# Patient Record
Sex: FEMALE | Race: WHITE | NOT HISPANIC OR LATINO | Employment: UNEMPLOYED | URBAN - NONMETROPOLITAN AREA
[De-identification: names, ages, dates, MRNs, and addresses within clinical notes are randomized per-mention and may not be internally consistent; named-entity substitution may affect disease eponyms.]

---

## 2020-08-18 ENCOUNTER — HOSPITAL ENCOUNTER (EMERGENCY)
Facility: HOSPITAL | Age: 25
Discharge: HOME | End: 2020-08-18
Attending: EMERGENCY MEDICINE
Payer: MEDICAID

## 2020-08-18 VITALS
SYSTOLIC BLOOD PRESSURE: 115 MMHG | OXYGEN SATURATION: 100 % | HEIGHT: 60 IN | DIASTOLIC BLOOD PRESSURE: 77 MMHG | RESPIRATION RATE: 12 BRPM | HEART RATE: 85 BPM | BODY MASS INDEX: 26.26 KG/M2 | TEMPERATURE: 98 F

## 2020-08-18 DIAGNOSIS — H92.03 OTALGIA OF BOTH EARS: Primary | ICD-10-CM

## 2020-08-18 PROCEDURE — 99284 EMERGENCY DEPT VISIT MOD MDM: CPT | Mod: 25

## 2020-08-18 PROCEDURE — 63600175 PHARM REV CODE 636 W HCPCS: Performed by: NURSE PRACTITIONER

## 2020-08-18 PROCEDURE — 96372 THER/PROPH/DIAG INJ SC/IM: CPT | Mod: 59

## 2020-08-18 PROCEDURE — 25000003 PHARM REV CODE 250: Performed by: NURSE PRACTITIONER

## 2020-08-18 RX ORDER — ACETAMINOPHEN 325 MG/1
650 TABLET ORAL
Status: COMPLETED | OUTPATIENT
Start: 2020-08-18 | End: 2020-08-18

## 2020-08-18 RX ORDER — LORATADINE 10 MG/1
10 TABLET ORAL DAILY
Qty: 30 TABLET | Refills: 0 | Status: SHIPPED | OUTPATIENT
Start: 2020-08-18 | End: 2020-08-18 | Stop reason: SDUPTHER

## 2020-08-18 RX ORDER — LORATADINE 10 MG/1
10 TABLET ORAL DAILY
Qty: 30 TABLET | Refills: 0 | Status: ON HOLD | OUTPATIENT
Start: 2020-08-18 | End: 2023-08-16 | Stop reason: HOSPADM

## 2020-08-18 RX ORDER — BETAMETHASONE SODIUM PHOSPHATE AND BETAMETHASONE ACETATE 3; 3 MG/ML; MG/ML
6 INJECTION, SUSPENSION INTRA-ARTICULAR; INTRALESIONAL; INTRAMUSCULAR; SOFT TISSUE
Status: COMPLETED | OUTPATIENT
Start: 2020-08-18 | End: 2020-08-18

## 2020-08-18 RX ADMIN — ACETAMINOPHEN 650 MG: 325 TABLET ORAL at 02:08

## 2020-08-18 RX ADMIN — BETAMETHASONE SODIUM PHOSPHATE AND BETAMETHASONE ACETATE 6 MG: 3; 3 INJECTION, SUSPENSION INTRA-ARTICULAR; INTRALESIONAL; INTRAMUSCULAR at 02:08

## 2020-08-18 NOTE — ED PROVIDER NOTES
Encounter Date: 8/18/2020       History     Chief Complaint   Patient presents with    Ear Drainage     alhaji ear pain beginning one day ago.  c/o dizziness due to ear fullness and swelling to inside of ears     Pt c/o bilat ear pain starting yesterday.Pt denies injury to her ears.  Pt denies drainage from ears. Pt denies decreased hearing.  Pt denies dental pain.  Pt denies cough or congestion.  She took Tylenol yesterday with minimal relief.          Review of patient's allergies indicates:   Allergen Reactions    Amoxicillin Swelling    Naproxen Nausea And Vomiting    Neurontin [gabapentin] Nausea And Vomiting    Toradol [ketorolac] Nausea And Vomiting    Tramadol Nausea And Vomiting     Past Medical History:   Diagnosis Date    Depression      Past Surgical History:   Procedure Laterality Date    CHOLECYSTECTOMY       Family History   Problem Relation Age of Onset    Alcohol abuse Mother     COPD Mother     Arthritis Father      Social History     Tobacco Use    Smoking status: Never Smoker    Smokeless tobacco: Never Used   Substance Use Topics    Alcohol use: No     Alcohol/week: 0.0 standard drinks    Drug use: No     Review of Systems   Constitutional: Negative.    HENT: Positive for ear pain. Negative for congestion, dental problem, ear discharge, facial swelling, hearing loss, postnasal drip, rhinorrhea, sinus pressure, sinus pain, sneezing, sore throat, tinnitus, trouble swallowing and voice change.    Eyes: Negative for pain and discharge.   Respiratory: Negative.    Cardiovascular: Negative.    Gastrointestinal: Negative for abdominal distention, abdominal pain, diarrhea, nausea and vomiting.   Genitourinary: Negative for difficulty urinating and dysuria.   Musculoskeletal: Negative.    Skin: Negative.    Neurological: Negative.        Physical Exam     Initial Vitals [08/18/20 1429]   BP Pulse Resp Temp SpO2   115/77 85 12 98.4 °F (36.9 °C) 100 %      MAP       --         Physical  Exam    Nursing note and vitals reviewed.  Constitutional: She appears well-developed and well-nourished. She is not diaphoretic. No distress.   HENT:   Head: Normocephalic.   Right Ear: Hearing, tympanic membrane, external ear and ear canal normal. There is tenderness. No drainage or swelling. No foreign bodies. Tympanic membrane is not bulging. No middle ear effusion. No decreased hearing is noted.   Left Ear: Hearing, tympanic membrane, external ear and ear canal normal. There is tenderness. No drainage or swelling. No foreign bodies. Tympanic membrane is not bulging.  No middle ear effusion. No decreased hearing is noted.   Mouth/Throat: Uvula is midline, oropharynx is clear and moist and mucous membranes are normal. No uvula swelling or dental caries. No oropharyngeal exudate, posterior oropharyngeal edema or posterior oropharyngeal erythema.   Eyes: Pupils are equal, round, and reactive to light.   Neck: Neck supple.   Cardiovascular: Normal rate.   Pulmonary/Chest: Breath sounds normal. No respiratory distress. She has no wheezes.   Abdominal: Soft. Bowel sounds are normal. She exhibits no distension. There is no abdominal tenderness.   Musculoskeletal: Normal range of motion. No tenderness or edema.   Neurological: She is alert and oriented to person, place, and time. She has normal strength. GCS score is 15. GCS eye subscore is 4. GCS verbal subscore is 5. GCS motor subscore is 6.   Skin: Skin is warm and dry. No rash noted. No erythema. No pallor.         ED Course   Procedures  Labs Reviewed - No data to display       Imaging Results    None          Medical Decision Making:   Differential Diagnosis:   Otitis media   Otitis externa  Viral illness  Otalgia                                   Clinical Impression:       ICD-10-CM ICD-9-CM   1. Otalgia of both ears  H92.03 388.70         Disposition:   Disposition: Discharged  Condition: Stable                        Aurelia Osman NP  08/18/20 [id number]

## 2020-10-31 ENCOUNTER — HOSPITAL ENCOUNTER (EMERGENCY)
Facility: HOSPITAL | Age: 25
Discharge: HOME | End: 2020-11-01
Attending: EMERGENCY MEDICINE
Payer: MEDICAID

## 2020-10-31 DIAGNOSIS — R10.9 FLANK PAIN: Primary | ICD-10-CM

## 2020-10-31 LAB
ALBUMIN SERPL BCP-MCNC: 3.9 G/DL (ref 3.5–5.2)
ALP SERPL-CCNC: 72 U/L (ref 55–135)
ALT SERPL W/O P-5'-P-CCNC: 19 U/L (ref 10–44)
ANION GAP SERPL CALC-SCNC: 6 MMOL/L (ref 8–16)
AST SERPL-CCNC: 11 U/L (ref 10–40)
BACTERIA #/AREA URNS HPF: NEGATIVE /HPF
BASOPHILS # BLD AUTO: 0.1 K/UL (ref 0–0.2)
BASOPHILS NFR BLD: 0.9 % (ref 0–1.9)
BILIRUB SERPL-MCNC: 0.4 MG/DL (ref 0.1–1)
BILIRUB UR QL STRIP: NEGATIVE
BUN SERPL-MCNC: 11 MG/DL (ref 6–20)
CALCIUM SERPL-MCNC: 9.1 MG/DL (ref 8.7–10.5)
CHLORIDE SERPL-SCNC: 107 MMOL/L (ref 95–110)
CLARITY UR: CLEAR
CO2 SERPL-SCNC: 26 MMOL/L (ref 23–29)
COLOR UR: YELLOW
CREAT SERPL-MCNC: 0.8 MG/DL (ref 0.5–1.4)
DIFFERENTIAL METHOD: ABNORMAL
EOSINOPHIL # BLD AUTO: 0.4 K/UL (ref 0–0.5)
EOSINOPHIL NFR BLD: 3.7 % (ref 0–8)
ERYTHROCYTE [DISTWIDTH] IN BLOOD BY AUTOMATED COUNT: 13.2 % (ref 11.5–14.5)
EST. GFR  (AFRICAN AMERICAN): >60 ML/MIN/1.73 M^2
EST. GFR  (NON AFRICAN AMERICAN): >60 ML/MIN/1.73 M^2
GLUCOSE SERPL-MCNC: 80 MG/DL (ref 70–110)
GLUCOSE UR QL STRIP: NEGATIVE
HCG INTACT+B SERPL-ACNC: <1 MIU/ML
HCT VFR BLD AUTO: 39.3 % (ref 37–48.5)
HGB BLD-MCNC: 12.5 G/DL (ref 12–16)
HGB UR QL STRIP: NEGATIVE
HYALINE CASTS #/AREA URNS LPF: 0 /LPF
IMM GRANULOCYTES # BLD AUTO: 0.03 K/UL (ref 0–0.04)
IMM GRANULOCYTES NFR BLD AUTO: 0.3 % (ref 0–0.5)
KETONES UR QL STRIP: NEGATIVE
LEUKOCYTE ESTERASE UR QL STRIP: ABNORMAL
LIPASE SERPL-CCNC: 57 U/L (ref 23–300)
LYMPHOCYTES # BLD AUTO: 1.9 K/UL (ref 1–4.8)
LYMPHOCYTES NFR BLD: 16.2 % (ref 18–48)
MCH RBC QN AUTO: 27.7 PG (ref 27–31)
MCHC RBC AUTO-ENTMCNC: 31.8 G/DL (ref 32–36)
MCV RBC AUTO: 87 FL (ref 82–98)
MICROSCOPIC COMMENT: NORMAL
MONOCYTES # BLD AUTO: 1.3 K/UL (ref 0.3–1)
MONOCYTES NFR BLD: 11.4 % (ref 4–15)
NEUTROPHILS # BLD AUTO: 7.9 K/UL (ref 1.8–7.7)
NEUTROPHILS NFR BLD: 67.5 % (ref 38–73)
NITRITE UR QL STRIP: NEGATIVE
NRBC BLD-RTO: 0 /100 WBC
PH UR STRIP: 6 [PH] (ref 5–8)
PLATELET # BLD AUTO: 347 K/UL (ref 150–350)
PMV BLD AUTO: 10 FL (ref 9.2–12.9)
POTASSIUM SERPL-SCNC: 4 MMOL/L (ref 3.5–5.1)
PROT SERPL-MCNC: 7.6 G/DL (ref 6–8.4)
PROT UR QL STRIP: NEGATIVE
RBC # BLD AUTO: 4.51 M/UL (ref 4–5.4)
RBC #/AREA URNS HPF: 1 /HPF (ref 0–4)
SODIUM SERPL-SCNC: 139 MMOL/L (ref 136–145)
SP GR UR STRIP: 1.02 (ref 1–1.03)
SQUAMOUS #/AREA URNS HPF: 1 /HPF
URN SPEC COLLECT METH UR: ABNORMAL
UROBILINOGEN UR STRIP-ACNC: 1 EU/DL
WBC # BLD AUTO: 11.7 K/UL (ref 3.9–12.7)
WBC #/AREA URNS HPF: 1 /HPF (ref 0–5)

## 2020-10-31 PROCEDURE — 96361 HYDRATE IV INFUSION ADD-ON: CPT

## 2020-10-31 PROCEDURE — 96375 TX/PRO/DX INJ NEW DRUG ADDON: CPT

## 2020-10-31 PROCEDURE — 25000003 PHARM REV CODE 250: Performed by: EMERGENCY MEDICINE

## 2020-10-31 PROCEDURE — 63600175 PHARM REV CODE 636 W HCPCS: Performed by: EMERGENCY MEDICINE

## 2020-10-31 PROCEDURE — 81000 URINALYSIS NONAUTO W/SCOPE: CPT

## 2020-10-31 PROCEDURE — 85025 COMPLETE CBC W/AUTO DIFF WBC: CPT

## 2020-10-31 PROCEDURE — 99284 EMERGENCY DEPT VISIT MOD MDM: CPT | Mod: 25

## 2020-10-31 PROCEDURE — 80053 COMPREHEN METABOLIC PANEL: CPT

## 2020-10-31 PROCEDURE — 36415 COLL VENOUS BLD VENIPUNCTURE: CPT

## 2020-10-31 PROCEDURE — 96374 THER/PROPH/DIAG INJ IV PUSH: CPT

## 2020-10-31 PROCEDURE — 83690 ASSAY OF LIPASE: CPT

## 2020-10-31 PROCEDURE — 84702 CHORIONIC GONADOTROPIN TEST: CPT

## 2020-10-31 RX ORDER — MORPHINE SULFATE 4 MG/ML
4 INJECTION, SOLUTION INTRAMUSCULAR; INTRAVENOUS
Status: COMPLETED | OUTPATIENT
Start: 2020-10-31 | End: 2020-10-31

## 2020-10-31 RX ORDER — ONDANSETRON 2 MG/ML
4 INJECTION INTRAMUSCULAR; INTRAVENOUS
Status: COMPLETED | OUTPATIENT
Start: 2020-10-31 | End: 2020-10-31

## 2020-10-31 RX ADMIN — MORPHINE SULFATE 4 MG: 4 INJECTION, SOLUTION INTRAMUSCULAR; INTRAVENOUS at 10:10

## 2020-10-31 RX ADMIN — SODIUM CHLORIDE 1000 ML: 0.9 INJECTION, SOLUTION INTRAVENOUS at 09:10

## 2020-10-31 RX ADMIN — ONDANSETRON 4 MG: 2 INJECTION INTRAMUSCULAR; INTRAVENOUS at 10:10

## 2020-11-01 VITALS
RESPIRATION RATE: 16 BRPM | HEIGHT: 60 IN | BODY MASS INDEX: 32.04 KG/M2 | OXYGEN SATURATION: 100 % | TEMPERATURE: 98 F | DIASTOLIC BLOOD PRESSURE: 75 MMHG | SYSTOLIC BLOOD PRESSURE: 114 MMHG | WEIGHT: 163.19 LBS | HEART RATE: 85 BPM

## 2020-11-01 RX ORDER — ONDANSETRON 4 MG/1
4 TABLET, ORALLY DISINTEGRATING ORAL EVERY 6 HOURS PRN
Qty: 12 TABLET | Refills: 0 | Status: SHIPPED | OUTPATIENT
Start: 2020-11-01 | End: 2022-11-15 | Stop reason: SDUPTHER

## 2020-11-01 RX ORDER — DICYCLOMINE HYDROCHLORIDE 20 MG/1
20 TABLET ORAL 2 TIMES DAILY
Qty: 20 TABLET | Refills: 0 | Status: SHIPPED | OUTPATIENT
Start: 2020-11-01 | End: 2020-12-01

## 2020-11-01 NOTE — ED PROVIDER NOTES
Encounter Date: 10/31/2020       History     Chief Complaint   Patient presents with    Flank Pain     RIGHT FLANK PAIN RADIATING TO RIGHT LOW ABDOMEN FOR 2 DAYS WITH NAUSEA AND VOMTING     The patient is a 24-year-old female, that presents to the ER complaining of right-sided flank pain.  The patient states that for the last 2 days she has been experiencing right flank pressure, with sharp pain radiating around to her right lower abdominal area.  The patient does report associated nausea and has experienced several episodes of nonbilious nonbloody emesis.  Patient denies any cough or congestion, focal anterior abdominal pain, fevers, chills or any other acute negative symptoms.        Review of patient's allergies indicates:   Allergen Reactions    Amoxicillin Swelling    Naproxen Nausea And Vomiting    Neurontin [gabapentin] Nausea And Vomiting    Toradol [ketorolac] Nausea And Vomiting    Tramadol Nausea And Vomiting     Past Medical History:   Diagnosis Date    Anxiety     Depression      Past Surgical History:   Procedure Laterality Date    CHOLECYSTECTOMY       Family History   Problem Relation Age of Onset    Alcohol abuse Mother     COPD Mother     Arthritis Father      Social History     Tobacco Use    Smoking status: Former Smoker    Smokeless tobacco: Never Used   Substance Use Topics    Alcohol use: Not Currently     Alcohol/week: 0.0 standard drinks    Drug use: No     Review of Systems   All other systems reviewed and are negative.      Physical Exam     Initial Vitals [10/31/20 2046]   BP Pulse Resp Temp SpO2   (!) 116/94 83 18 98.1 °F (36.7 °C) 100 %      MAP       --         Physical Exam    Nursing note and vitals reviewed.  Constitutional: She appears well-developed and well-nourished.   HENT:   Head: Normocephalic and atraumatic.   Eyes: Conjunctivae and EOM are normal. Pupils are equal, round, and reactive to light.   Neck: Normal range of motion. Neck supple. No tracheal  deviation present.   Cardiovascular: Normal rate, regular rhythm, normal heart sounds and intact distal pulses.   Pulmonary/Chest: Breath sounds normal. No respiratory distress. She has no wheezes. She has no rhonchi. She has no rales. She exhibits no tenderness.   Abdominal: Soft. Bowel sounds are normal. She exhibits no distension and no mass. There is no abdominal tenderness. There is no rebound and no guarding.   Genitourinary:    Genitourinary Comments: Mild right-sided percussive CVA tenderness     Musculoskeletal: Normal range of motion. No tenderness or edema.   Neurological: She is alert and oriented to person, place, and time. She has normal strength and normal reflexes. She displays normal reflexes. No cranial nerve deficit or sensory deficit.   Skin: Skin is warm and dry. Capillary refill takes less than 2 seconds.   Psychiatric: She has a normal mood and affect. Her behavior is normal. Judgment and thought content normal.         ED Course   Procedures  Labs Reviewed   CBC W/ AUTO DIFFERENTIAL - Abnormal; Notable for the following components:       Result Value    MCHC 31.8 (*)     Gran # (ANC) 7.9 (*)     Mono # 1.3 (*)     Lymph % 16.2 (*)     All other components within normal limits   COMPREHENSIVE METABOLIC PANEL - Abnormal; Notable for the following components:    Anion Gap 6 (*)     All other components within normal limits   URINALYSIS, REFLEX TO URINE CULTURE - Abnormal; Notable for the following components:    Leukocytes, UA Trace (*)     All other components within normal limits    Narrative:     Specimen Source->Urine   LIPASE   HCG, QUANTITATIVE, PREGNANCY   URINALYSIS MICROSCOPIC    Narrative:     Specimen Source->Urine          Imaging Results          CT Abdomen Pelvis  Without Contrast (Final result)  Result time 11/01/20 00:02:47    Final result by Jerel Duong MD (11/01/20 00:02:47)                 Impression:      1. No acute inflammatory process detected within the abdomen or  pelvis on this noncontrast study.  2. Mild dependent pelvic free fluid which is nonspecific, but in the range of physiologic.      Electronically signed by: Jerel Duong MD  Date:    11/01/2020  Time:    00:02             Narrative:    EXAMINATION:  CT ABDOMEN PELVIS WITHOUT CONTRAST    CLINICAL HISTORY:  Right flank pain flank pain;    TECHNIQUE:  Axial CT images were obtained from the lung bases through the pelvis without oral or intravenous contrast.  Multiplanar reconstructions evaluated.  Iterative reconstruction technique was used.  CT/Cardiac nuclear examinations in the past 12 months: 0    COMPARISON:  06/24/2019    FINDINGS:  Imaged lung bases are clear.  Lack of intravenous and oral contrast limits evaluation of the abdominal and pelvic structures.  Unenhanced liver, spleen, adrenals, pancreas, gallbladder, and kidneys are unremarkable.  No evidence of nephroureterolithiasis or hydroureteronephrosis.  No bowel wall thickening or pattern of obstruction.  Normal appendix.  No adenopathy or free air.  Mild dependent pelvic free fluid.  No urinary bladder wall thickening or radiodense calculi.  No detected uterine or adnexal abnormality within the confines of this modality.  No acute osseous change.                                 Medical Decision Making:   ED Management:  The patient/grossly within normal limits, but because the patient did have reproducible percussive CVA tenderness a CT scan was performed.  The CT was ultimately negative for any acute intra-abdominal pathology.  The patient reports improved symptoms after receiving meds for pain and nausea.  At this point the pain and shin is being discharged home was given instructions to follow-up the primary care physician/clinic for further assessment evaluation, and return to the ER for any acute negative symptoms.  The patient verbalized understanding of this medical plan and agreed                             Clinical Impression:     ICD-10-CM  ICD-9-CM   1. Flank pain  R10.9 789.09                          ED Disposition Condition    Discharge Stable        ED Prescriptions     Medication Sig Dispense Start Date End Date Auth. Provider    dicyclomine (BENTYL) 20 mg tablet Take 1 tablet (20 mg total) by mouth 2 (two) times daily. 20 tablet 11/1/2020 12/1/2020 Al Jurado MD    ondansetron (ZOFRAN-ODT) 4 MG TbDL Take 1 tablet (4 mg total) by mouth every 6 (six) hours as needed (nausea). 12 tablet 11/1/2020  Al Jruado MD        Follow-up Information     Follow up With Specialties Details Why Contact Info    [facility] Psychology, Internal Medicine, Gynecology, Dental General Practice Schedule an appointment as soon as possible for a visit   [address] [place] [id number]  [phone]                                         Al Jurado MD  11/01/20 0023

## 2020-12-08 ENCOUNTER — HOSPITAL ENCOUNTER (EMERGENCY)
Facility: HOSPITAL | Age: 25
Discharge: HOME | End: 2020-12-08
Attending: EMERGENCY MEDICINE
Payer: MEDICAID

## 2020-12-08 VITALS
TEMPERATURE: 98 F | HEART RATE: 82 BPM | RESPIRATION RATE: 18 BRPM | HEIGHT: 60 IN | DIASTOLIC BLOOD PRESSURE: 88 MMHG | WEIGHT: 165 LBS | OXYGEN SATURATION: 99 % | SYSTOLIC BLOOD PRESSURE: 125 MMHG | BODY MASS INDEX: 32.39 KG/M2

## 2020-12-08 DIAGNOSIS — R11.2 NON-INTRACTABLE VOMITING WITH NAUSEA, UNSPECIFIED VOMITING TYPE: ICD-10-CM

## 2020-12-08 DIAGNOSIS — Z34.90 PREGNANCY, UNSPECIFIED GESTATIONAL AGE: Primary | ICD-10-CM

## 2020-12-08 LAB
BACTERIA #/AREA URNS HPF: NEGATIVE /HPF
BILIRUB UR QL STRIP: NEGATIVE
CLARITY UR: CLEAR
COLOR UR: YELLOW
GLUCOSE UR QL STRIP: NEGATIVE
HCG INTACT+B SERPL-ACNC: 369 MIU/ML
HGB UR QL STRIP: NEGATIVE
HYALINE CASTS #/AREA URNS LPF: 5 /LPF
KETONES UR QL STRIP: NEGATIVE
LEUKOCYTE ESTERASE UR QL STRIP: ABNORMAL
MICROSCOPIC COMMENT: ABNORMAL
NITRITE UR QL STRIP: NEGATIVE
PH UR STRIP: 6 [PH] (ref 5–8)
PROT UR QL STRIP: NEGATIVE
RBC #/AREA URNS HPF: 1 /HPF (ref 0–4)
SP GR UR STRIP: 1.01 (ref 1–1.03)
SQUAMOUS #/AREA URNS HPF: 6 /HPF
URN SPEC COLLECT METH UR: ABNORMAL
UROBILINOGEN UR STRIP-ACNC: 1 EU/DL
WBC #/AREA URNS HPF: 7 /HPF (ref 0–5)

## 2020-12-08 PROCEDURE — 36415 COLL VENOUS BLD VENIPUNCTURE: CPT

## 2020-12-08 PROCEDURE — 99283 EMERGENCY DEPT VISIT LOW MDM: CPT

## 2020-12-08 PROCEDURE — 81000 URINALYSIS NONAUTO W/SCOPE: CPT

## 2020-12-08 PROCEDURE — 84702 CHORIONIC GONADOTROPIN TEST: CPT

## 2020-12-08 RX ORDER — FAMOTIDINE 20 MG
1 TABLET ORAL DAILY
Qty: 30 TABLET | Refills: 0 | COMMUNITY
Start: 2020-12-08 | End: 2023-01-20 | Stop reason: SDUPTHER

## 2020-12-08 RX ORDER — DOXYLAMINE SUCCINATE AND PYRIDOXINE HYDROCHLORIDE, DELAYED RELEASE TABLETS 10 MG/10 MG 10; 10 MG/1; MG/1
2 TABLET, DELAYED RELEASE ORAL NIGHTLY
Qty: 28 TABLET | Refills: 0 | Status: SHIPPED | OUTPATIENT
Start: 2020-12-08 | End: 2023-01-20 | Stop reason: SDUPTHER

## 2020-12-08 NOTE — ED PROVIDER NOTES
Encounter Date: 2020       History     Chief Complaint   Patient presents with    Vomiting     vomiting a couple times a day for a couple weeks, late for my cycle     This is a 25-year-old white female  who presents to the emergency department with concerns regarding daily nausea and occasional vomiting for the last 1-2 weeks.  Patient reports that she has a few weeks late for her menstrual cycle, stating her last cycle was beginning of November , and she suspects she may be pregnant.  She denies diarrhea, black or bloody bowel movements, vaginal bleeding, abdominal cramping, back pain, are difficulty urinating.        Review of patient's allergies indicates:   Allergen Reactions    Amoxicillin Swelling    Naproxen Nausea And Vomiting    Neurontin [gabapentin] Nausea And Vomiting    Toradol [ketorolac] Nausea And Vomiting    Tramadol Nausea And Vomiting     Past Medical History:   Diagnosis Date    Anxiety     Depression      Past Surgical History:   Procedure Laterality Date    CHOLECYSTECTOMY       Family History   Problem Relation Age of Onset    Alcohol abuse Mother     COPD Mother     Arthritis Father      Social History     Tobacco Use    Smoking status: Former Smoker    Smokeless tobacco: Never Used   Substance Use Topics    Alcohol use: Not Currently     Alcohol/week: 0.0 standard drinks    Drug use: No     Review of Systems   Constitutional: Negative.    Respiratory: Negative.    Cardiovascular: Negative.    Gastrointestinal: Negative.    Genitourinary: Positive for difficulty urinating and menstrual problem ( missed menses). Negative for decreased urine volume, dyspareunia, dysuria, hematuria, pelvic pain, urgency, vaginal bleeding, vaginal discharge and vaginal pain.   Musculoskeletal: Negative.    Neurological: Negative.        Physical Exam     Initial Vitals [20 1138]   BP Pulse Resp Temp SpO2   (!) 128/93 85 16 98.3 °F (36.8 °C) 100 %      MAP       --         Physical  Exam    Nursing note and vitals reviewed.  Constitutional: She appears well-developed and well-nourished. No distress.   HENT:   Head: Normocephalic and atraumatic.   Mouth/Throat: Oropharynx is clear and moist.   Eyes: EOM are normal. Pupils are equal, round, and reactive to light.   Neck: Normal range of motion. Neck supple.   Cardiovascular: Normal rate.   Pulmonary/Chest: No respiratory distress.   Musculoskeletal: Normal range of motion.   Neurological: She is alert and oriented to person, place, and time. She has normal strength. GCS score is 15. GCS eye subscore is 4. GCS verbal subscore is 5. GCS motor subscore is 6.   Skin: Skin is warm and dry. Capillary refill takes less than 2 seconds.   Psychiatric: She has a normal mood and affect. Thought content normal.         ED Course   Procedures  Labs Reviewed   URINALYSIS, REFLEX TO URINE CULTURE - Abnormal; Notable for the following components:       Result Value    Leukocytes, UA 1+ (*)     All other components within normal limits    Narrative:     Specimen Source->Urine   URINALYSIS MICROSCOPIC - Abnormal; Notable for the following components:    WBC, UA 7 (*)     Hyaline Casts, UA 5 (*)     All other components within normal limits    Narrative:     Specimen Source->Urine   HCG, QUANTITATIVE, PREGNANCY          Imaging Results    None          Medical Decision Making:   Differential Diagnosis:   Pregnancy  Nausea and vomiting of pregnancy  Gastroenteritis  Medical screening                             Clinical Impression:       ICD-10-CM ICD-9-CM   1. Pregnancy, unspecified gestational age  Z34.90 V22.2   2. Non-intractable vomiting with nausea, unspecified vomiting type  R11.2 787.01                          ED Disposition Condition    Discharge Stable        ED Prescriptions     Medication Sig Dispense Start Date End Date Auth. Provider    doxylamine-pyridoxine, vit B6, (DICLEGIS) 10-10 mg TbEC Take 2 tablets by mouth every evening. 28 tablet 12/8/2020   Beth Zavala NP    prenatal 21-iron fu-folic acid (PRENATAL COMPLETE) 14 mg iron- 400 mcg Tab Take 1 tablet by mouth once daily. 30 tablet 12/8/2020 12/8/2021 Beth Zavala NP        Follow-up Information     Follow up With Specialties Details Why Contact Info    PCP Follow UP  Call in 2 days for follow-up, for re-evaluation of today's complaint     OBGYN of your choice  In 2 days for re-evaluation of today's complaint                                        Beth Zavala NP  12/08/20 [id number]

## 2021-03-16 ENCOUNTER — HOSPITAL ENCOUNTER (EMERGENCY)
Facility: HOSPITAL | Age: 26
Discharge: HOME | End: 2021-03-16
Attending: EMERGENCY MEDICINE
Payer: MEDICAID

## 2021-03-16 VITALS
TEMPERATURE: 99 F | BODY MASS INDEX: 31.77 KG/M2 | DIASTOLIC BLOOD PRESSURE: 67 MMHG | WEIGHT: 161.81 LBS | OXYGEN SATURATION: 98 % | RESPIRATION RATE: 16 BRPM | HEIGHT: 60 IN | HEART RATE: 99 BPM | SYSTOLIC BLOOD PRESSURE: 109 MMHG

## 2021-03-16 DIAGNOSIS — A05.9 FOOD POISONING: Primary | ICD-10-CM

## 2021-03-16 LAB
ALBUMIN SERPL BCP-MCNC: 3.5 G/DL (ref 3.5–5.2)
ALP SERPL-CCNC: 68 U/L (ref 55–135)
ALT SERPL W/O P-5'-P-CCNC: 20 U/L (ref 10–44)
ANION GAP SERPL CALC-SCNC: 10 MMOL/L (ref 8–16)
AST SERPL-CCNC: 13 U/L (ref 10–40)
BACTERIA #/AREA URNS HPF: NEGATIVE /HPF
BASOPHILS # BLD AUTO: 0.02 K/UL (ref 0–0.2)
BASOPHILS NFR BLD: 0.2 % (ref 0–1.9)
BILIRUB SERPL-MCNC: 0.6 MG/DL (ref 0.1–1)
BILIRUB UR QL STRIP: NEGATIVE
BUN SERPL-MCNC: 10 MG/DL (ref 6–20)
CALCIUM SERPL-MCNC: 9.4 MG/DL (ref 8.7–10.5)
CHLORIDE SERPL-SCNC: 108 MMOL/L (ref 95–110)
CLARITY UR: CLEAR
CO2 SERPL-SCNC: 23 MMOL/L (ref 23–29)
COLOR UR: YELLOW
CREAT SERPL-MCNC: 0.5 MG/DL (ref 0.5–1.4)
DIFFERENTIAL METHOD: ABNORMAL
EOSINOPHIL # BLD AUTO: 0 K/UL (ref 0–0.5)
EOSINOPHIL NFR BLD: 0 % (ref 0–8)
ERYTHROCYTE [DISTWIDTH] IN BLOOD BY AUTOMATED COUNT: 14.3 % (ref 11.5–14.5)
EST. GFR  (AFRICAN AMERICAN): >60 ML/MIN/1.73 M^2
EST. GFR  (NON AFRICAN AMERICAN): >60 ML/MIN/1.73 M^2
GLUCOSE SERPL-MCNC: 124 MG/DL (ref 70–110)
GLUCOSE UR QL STRIP: ABNORMAL
HCT VFR BLD AUTO: 38.1 % (ref 37–48.5)
HGB BLD-MCNC: 12.4 G/DL (ref 12–16)
HGB UR QL STRIP: NEGATIVE
HYALINE CASTS #/AREA URNS LPF: 11 /LPF
IMM GRANULOCYTES # BLD AUTO: 0.05 K/UL (ref 0–0.04)
IMM GRANULOCYTES NFR BLD AUTO: 0.4 % (ref 0–0.5)
KETONES UR QL STRIP: >=160
LEUKOCYTE ESTERASE UR QL STRIP: NEGATIVE
LIPASE SERPL-CCNC: 61 U/L (ref 23–300)
LYMPHOCYTES # BLD AUTO: 0.4 K/UL (ref 1–4.8)
LYMPHOCYTES NFR BLD: 3 % (ref 18–48)
MCH RBC QN AUTO: 28.5 PG (ref 27–31)
MCHC RBC AUTO-ENTMCNC: 32.5 G/DL (ref 32–36)
MCV RBC AUTO: 88 FL (ref 82–98)
MICROSCOPIC COMMENT: ABNORMAL
MONOCYTES # BLD AUTO: 0.5 K/UL (ref 0.3–1)
MONOCYTES NFR BLD: 3.8 % (ref 4–15)
NEUTROPHILS # BLD AUTO: 11.2 K/UL (ref 1.8–7.7)
NEUTROPHILS NFR BLD: 92.6 % (ref 38–73)
NITRITE UR QL STRIP: NEGATIVE
NRBC BLD-RTO: 0 /100 WBC
PH UR STRIP: 6 [PH] (ref 5–8)
PLATELET # BLD AUTO: 253 K/UL (ref 150–350)
PMV BLD AUTO: 10.5 FL (ref 9.2–12.9)
POTASSIUM SERPL-SCNC: 3.8 MMOL/L (ref 3.5–5.1)
PROT SERPL-MCNC: 7.5 G/DL (ref 6–8.4)
PROT UR QL STRIP: ABNORMAL
RBC # BLD AUTO: 4.35 M/UL (ref 4–5.4)
RBC #/AREA URNS HPF: 3 /HPF (ref 0–4)
SODIUM SERPL-SCNC: 141 MMOL/L (ref 136–145)
SP GR UR STRIP: >=1.03 (ref 1–1.03)
SQUAMOUS #/AREA URNS HPF: 7 /HPF
URN SPEC COLLECT METH UR: ABNORMAL
UROBILINOGEN UR STRIP-ACNC: 1 EU/DL
WBC # BLD AUTO: 12.07 K/UL (ref 3.9–12.7)
WBC #/AREA URNS HPF: 4 /HPF (ref 0–5)

## 2021-03-16 PROCEDURE — 99284 EMERGENCY DEPT VISIT MOD MDM: CPT | Mod: 25

## 2021-03-16 PROCEDURE — 83690 ASSAY OF LIPASE: CPT | Performed by: EMERGENCY MEDICINE

## 2021-03-16 PROCEDURE — 96375 TX/PRO/DX INJ NEW DRUG ADDON: CPT

## 2021-03-16 PROCEDURE — 85025 COMPLETE CBC W/AUTO DIFF WBC: CPT | Performed by: EMERGENCY MEDICINE

## 2021-03-16 PROCEDURE — 81000 URINALYSIS NONAUTO W/SCOPE: CPT | Performed by: EMERGENCY MEDICINE

## 2021-03-16 PROCEDURE — 36415 COLL VENOUS BLD VENIPUNCTURE: CPT | Performed by: EMERGENCY MEDICINE

## 2021-03-16 PROCEDURE — 63600175 PHARM REV CODE 636 W HCPCS: Performed by: EMERGENCY MEDICINE

## 2021-03-16 PROCEDURE — 80053 COMPREHEN METABOLIC PANEL: CPT | Performed by: EMERGENCY MEDICINE

## 2021-03-16 PROCEDURE — 25000003 PHARM REV CODE 250: Performed by: EMERGENCY MEDICINE

## 2021-03-16 PROCEDURE — 96365 THER/PROPH/DIAG IV INF INIT: CPT

## 2021-03-16 RX ORDER — DEXTROSE MONOHYDRATE AND SODIUM CHLORIDE 5; .9 G/100ML; G/100ML
INJECTION, SOLUTION INTRAVENOUS
Status: COMPLETED | OUTPATIENT
Start: 2021-03-16 | End: 2021-03-16

## 2021-03-16 RX ORDER — SUCRALFATE 1 G/10ML
1 SUSPENSION ORAL
Status: COMPLETED | OUTPATIENT
Start: 2021-03-16 | End: 2021-03-16

## 2021-03-16 RX ORDER — METOCLOPRAMIDE HYDROCHLORIDE 5 MG/ML
5 INJECTION INTRAMUSCULAR; INTRAVENOUS
Status: COMPLETED | OUTPATIENT
Start: 2021-03-16 | End: 2021-03-16

## 2021-03-16 RX ORDER — DIPHENHYDRAMINE HYDROCHLORIDE 50 MG/ML
25 INJECTION INTRAMUSCULAR; INTRAVENOUS
Status: COMPLETED | OUTPATIENT
Start: 2021-03-16 | End: 2021-03-16

## 2021-03-16 RX ORDER — ACETAMINOPHEN 500 MG
1000 TABLET ORAL
Status: COMPLETED | OUTPATIENT
Start: 2021-03-16 | End: 2021-03-16

## 2021-03-16 RX ADMIN — PROMETHAZINE HYDROCHLORIDE 25 MG: 25 INJECTION INTRAMUSCULAR; INTRAVENOUS at 02:03

## 2021-03-16 RX ADMIN — DEXTROSE AND SODIUM CHLORIDE: 5; .9 INJECTION, SOLUTION INTRAVENOUS at 01:03

## 2021-03-16 RX ADMIN — SUCRALFATE 1 G: 1 SUSPENSION ORAL at 03:03

## 2021-03-16 RX ADMIN — METOCLOPRAMIDE 5 MG: 5 INJECTION, SOLUTION INTRAMUSCULAR; INTRAVENOUS at 03:03

## 2021-03-16 RX ADMIN — ACETAMINOPHEN 1000 MG: 500 TABLET ORAL at 03:03

## 2021-03-16 RX ADMIN — DIPHENHYDRAMINE HYDROCHLORIDE 25 MG: 50 INJECTION INTRAMUSCULAR; INTRAVENOUS at 03:03

## 2021-05-15 ENCOUNTER — HOSPITAL ENCOUNTER (OUTPATIENT)
Facility: HOSPITAL | Age: 26
Discharge: HOME | End: 2021-05-15
Attending: OBSTETRICS & GYNECOLOGY | Admitting: OBSTETRICS & GYNECOLOGY
Payer: MEDICAID

## 2021-05-15 VITALS — WEIGHT: 163.13 LBS | TEMPERATURE: 99 F | HEIGHT: 60 IN | BODY MASS INDEX: 32.02 KG/M2

## 2021-05-15 DIAGNOSIS — R10.9 CRAMP, ABDOMINAL: ICD-10-CM

## 2021-05-15 LAB
BACTERIA #/AREA URNS HPF: NEGATIVE /HPF
BILIRUB UR QL STRIP: NEGATIVE
CLARITY UR: CLEAR
COLOR UR: YELLOW
GLUCOSE UR QL STRIP: NEGATIVE
HGB UR QL STRIP: NEGATIVE
HYALINE CASTS #/AREA URNS LPF: 2 /LPF
KETONES UR QL STRIP: NEGATIVE
LEUKOCYTE ESTERASE UR QL STRIP: ABNORMAL
MICROSCOPIC COMMENT: ABNORMAL
NITRITE UR QL STRIP: NEGATIVE
PH UR STRIP: 7 [PH] (ref 5–8)
PROT UR QL STRIP: NEGATIVE
RBC #/AREA URNS HPF: 1 /HPF (ref 0–4)
SP GR UR STRIP: 1.01 (ref 1–1.03)
SQUAMOUS #/AREA URNS HPF: 3 /HPF
URN SPEC COLLECT METH UR: ABNORMAL
UROBILINOGEN UR STRIP-ACNC: 1 EU/DL
WBC #/AREA URNS HPF: 2 /HPF (ref 0–5)

## 2021-05-15 PROCEDURE — 81000 URINALYSIS NONAUTO W/SCOPE: CPT | Performed by: OBSTETRICS & GYNECOLOGY

## 2021-05-15 PROCEDURE — 99211 OFF/OP EST MAY X REQ PHY/QHP: CPT

## 2021-05-15 PROCEDURE — 25000003 PHARM REV CODE 250: Performed by: OBSTETRICS & GYNECOLOGY

## 2021-05-15 RX ORDER — ONDANSETRON 4 MG/1
8 TABLET, ORALLY DISINTEGRATING ORAL EVERY 8 HOURS PRN
Status: DISCONTINUED | OUTPATIENT
Start: 2021-05-15 | End: 2021-05-15 | Stop reason: HOSPADM

## 2021-05-15 RX ORDER — PROCHLORPERAZINE EDISYLATE 5 MG/ML
5 INJECTION INTRAMUSCULAR; INTRAVENOUS EVERY 6 HOURS PRN
Status: DISCONTINUED | OUTPATIENT
Start: 2021-05-15 | End: 2021-05-15 | Stop reason: HOSPADM

## 2021-05-15 RX ORDER — SODIUM CHLORIDE, SODIUM LACTATE, POTASSIUM CHLORIDE, CALCIUM CHLORIDE 600; 310; 30; 20 MG/100ML; MG/100ML; MG/100ML; MG/100ML
INJECTION, SOLUTION INTRAVENOUS CONTINUOUS
Status: DISCONTINUED | OUTPATIENT
Start: 2021-05-15 | End: 2021-05-15 | Stop reason: HOSPADM

## 2021-05-15 RX ORDER — ACETAMINOPHEN 500 MG
500 TABLET ORAL EVERY 6 HOURS PRN
Status: DISCONTINUED | OUTPATIENT
Start: 2021-05-15 | End: 2021-05-15 | Stop reason: HOSPADM

## 2021-05-15 RX ADMIN — ONDANSETRON 8 MG: 4 TABLET, ORALLY DISINTEGRATING ORAL at 06:05

## 2021-09-03 ENCOUNTER — HOSPITAL ENCOUNTER (EMERGENCY)
Facility: HOSPITAL | Age: 26
Discharge: HOME | End: 2021-09-03
Attending: EMERGENCY MEDICINE
Payer: MEDICAID

## 2021-09-03 VITALS
TEMPERATURE: 98 F | HEART RATE: 80 BPM | OXYGEN SATURATION: 100 % | BODY MASS INDEX: 29.25 KG/M2 | RESPIRATION RATE: 20 BRPM | HEIGHT: 60 IN | DIASTOLIC BLOOD PRESSURE: 70 MMHG | SYSTOLIC BLOOD PRESSURE: 110 MMHG | WEIGHT: 149 LBS

## 2021-09-03 DIAGNOSIS — S60.221A CONTUSION OF RIGHT HAND, INITIAL ENCOUNTER: Primary | ICD-10-CM

## 2021-09-03 PROCEDURE — 99283 EMERGENCY DEPT VISIT LOW MDM: CPT | Mod: 25

## 2021-10-22 ENCOUNTER — HOSPITAL ENCOUNTER (EMERGENCY)
Facility: HOSPITAL | Age: 26
Discharge: HOME | End: 2021-10-22
Attending: EMERGENCY MEDICINE
Payer: MEDICAID

## 2021-10-22 VITALS
DIASTOLIC BLOOD PRESSURE: 77 MMHG | RESPIRATION RATE: 18 BRPM | HEART RATE: 82 BPM | WEIGHT: 145 LBS | TEMPERATURE: 99 F | BODY MASS INDEX: 28.47 KG/M2 | OXYGEN SATURATION: 98 % | HEIGHT: 60 IN | SYSTOLIC BLOOD PRESSURE: 116 MMHG

## 2021-10-22 DIAGNOSIS — S60.00XA CONTUSION OF FINGERTIP, INITIAL ENCOUNTER: Primary | ICD-10-CM

## 2021-10-22 DIAGNOSIS — S60.10XA SUBUNGUAL HEMATOMA OF DIGIT OF HAND, INITIAL ENCOUNTER: ICD-10-CM

## 2021-10-22 PROCEDURE — 99283 EMERGENCY DEPT VISIT LOW MDM: CPT | Mod: 25

## 2021-10-22 RX ORDER — HYDROCODONE BITARTRATE AND ACETAMINOPHEN 5; 325 MG/1; MG/1
1 TABLET ORAL
Status: DISCONTINUED | OUTPATIENT
Start: 2021-10-22 | End: 2021-10-22 | Stop reason: HOSPADM

## 2022-01-26 ENCOUNTER — HOSPITAL ENCOUNTER (EMERGENCY)
Facility: HOSPITAL | Age: 27
Discharge: HOME | End: 2022-01-26
Attending: STUDENT IN AN ORGANIZED HEALTH CARE EDUCATION/TRAINING PROGRAM
Payer: MEDICAID

## 2022-01-26 VITALS
TEMPERATURE: 98 F | HEART RATE: 70 BPM | OXYGEN SATURATION: 99 % | DIASTOLIC BLOOD PRESSURE: 70 MMHG | HEIGHT: 62 IN | WEIGHT: 150 LBS | RESPIRATION RATE: 16 BRPM | BODY MASS INDEX: 27.6 KG/M2 | SYSTOLIC BLOOD PRESSURE: 109 MMHG

## 2022-01-26 DIAGNOSIS — Z20.822 SUSPECTED COVID-19 VIRUS INFECTION: Primary | ICD-10-CM

## 2022-01-26 LAB — SARS-COV-2 RNA RESP QL NAA+PROBE: DETECTED

## 2022-01-26 PROCEDURE — U0002 COVID-19 LAB TEST NON-CDC: HCPCS | Performed by: STUDENT IN AN ORGANIZED HEALTH CARE EDUCATION/TRAINING PROGRAM

## 2022-01-26 PROCEDURE — 25000003 PHARM REV CODE 250: Performed by: STUDENT IN AN ORGANIZED HEALTH CARE EDUCATION/TRAINING PROGRAM

## 2022-01-26 PROCEDURE — 99283 EMERGENCY DEPT VISIT LOW MDM: CPT

## 2022-01-26 RX ORDER — ONDANSETRON 4 MG/1
4 TABLET, FILM COATED ORAL EVERY 6 HOURS
Qty: 12 TABLET | Refills: 0 | OUTPATIENT
Start: 2022-01-26 | End: 2022-12-12

## 2022-01-26 RX ORDER — ONDANSETRON 4 MG/1
4 TABLET, ORALLY DISINTEGRATING ORAL
Status: COMPLETED | OUTPATIENT
Start: 2022-01-26 | End: 2022-01-26

## 2022-01-26 RX ADMIN — ONDANSETRON 4 MG: 4 TABLET, ORALLY DISINTEGRATING ORAL at 03:01

## 2022-01-26 NOTE — ED PROVIDER NOTES
Encounter Date: 1/26/2022       History     Chief Complaint   Patient presents with    COVID-19 Concerns     Pt states that her  tested positive for covid last week.  Pt reports body aches, sore throat and runny nose since 'the other night'.     26-year-old female with no significant past medical history presents with myalgia, runny nose, sore throat, nonbloody nonbilious vomited for the past 2 nights.   is COVID positive last week.  Patient denies any shortness of breath, diarrhea        Review of patient's allergies indicates:   Allergen Reactions    Amoxicillin Swelling    Naproxen Nausea And Vomiting    Neurontin [gabapentin] Nausea And Vomiting    Toradol [ketorolac] Nausea And Vomiting    Tramadol Nausea And Vomiting     Past Medical History:   Diagnosis Date    Anxiety     Depression      Past Surgical History:   Procedure Laterality Date    CHOLECYSTECTOMY       Family History   Problem Relation Age of Onset    Alcohol abuse Mother     COPD Mother     Arthritis Father      Social History     Tobacco Use    Smoking status: Former Smoker    Smokeless tobacco: Never Used   Substance Use Topics    Alcohol use: Not Currently     Alcohol/week: 0.0 standard drinks    Drug use: No     Review of Systems   Constitutional: Positive for fatigue.   HENT: Positive for rhinorrhea. Negative for sore throat.    Respiratory: Negative.    Cardiovascular: Negative.    Gastrointestinal: Positive for nausea and vomiting.   Genitourinary: Negative.    Musculoskeletal: Positive for myalgias.   Skin: Negative.    Neurological: Negative.    Psychiatric/Behavioral: Negative.    All other systems reviewed and are negative.      Physical Exam     Initial Vitals [01/26/22 0300]   BP Pulse Resp Temp SpO2   109/70 70 16 98.3 °F (36.8 °C) 99 %      MAP       --         Physical Exam    Nursing note and vitals reviewed.  Constitutional: Vital signs are normal. She appears well-developed and well-nourished.    HENT:   Head: Normocephalic and atraumatic.   Mouth/Throat: No oropharyngeal exudate.   Eyes: Conjunctivae and lids are normal.   Neck: Trachea normal. Neck supple.   Cardiovascular: Normal rate, regular rhythm and normal pulses.   Pulmonary/Chest: Breath sounds normal. No respiratory distress.   Abdominal: Abdomen is soft. Bowel sounds are normal.   Musculoskeletal:         General: Normal range of motion.      Cervical back: Neck supple.     Neurological: She is alert and oriented to person, place, and time.   Skin: Skin is warm. Capillary refill takes less than 2 seconds.   Psychiatric: She has a normal mood and affect. Her speech is normal.         ED Course   Procedures  Labs Reviewed   SARS-COV-2 (COVID-19) QUALITATIVE PCR          Imaging Results    None          Medications   ondansetron disintegrating tablet 4 mg (has no administration in time range)     Medical Decision Making:   Initial Assessment:   26-year-old female with no significant past medical history presents with myalgia, runny nose, nonbloody nonbilious vomiting sore throat for the past 2 nights.  Afebrile vitals stable.  Will give Zofran and p.o. challenge.  COVID swab.  Gave patient strict return precaution.  Advised patient follow CDC guideline.  Advised patient symptomatic treat with Tylenol ibuprofen for fever and pain.  Continue hydration.  Use a pulse ox and monitor oxygen level and return if oxygen drops below 92% or severely short of breath on exertion.  https://www.cdc.gov/media/releases/2021/s1227-isolation-quarantine-guidance.html    Clinical Tests:   Lab Tests: Ordered and Reviewed       <> Summary of Lab: COVID             ED Course as of 01/26/22 0343   Wed Jan 26, 2022   0342 Patient tolerating p.o. challenge.  Will discharge patient home with Zofran.  Return precautions provided [HD]      ED Course User Index  [HD] Jose Davila MD             Clinical Impression:   Final diagnoses:  [Z20.822] Suspected COVID-19 virus  infection (Primary)          ED Disposition Condition    Discharge Stable        ED Prescriptions     Medication Sig Dispense Start Date End Date Auth. Provider    ondansetron (ZOFRAN) 4 MG tablet Take 1 tablet (4 mg total) by mouth every 6 (six) hours. 12 tablet 1/26/2022  Jose Davila MD        Follow-up Information     Follow up With Specialties Details Why Contact Info    [facility] Psychology, Internal Medicine, Gynecology, Dental General Practice  As needed, If symptoms worsen [address] [facility] [address]  [phone]             Jose Davila MD  01/26/22 6157

## 2022-01-26 NOTE — Clinical Note
"Shannan"Allan Guajardo was seen and treated in our emergency department on 1/26/2022.  She may return to work on 02/02/2022.       If you have any questions or concerns, please don't hesitate to call.      Jose Davila MD"

## 2022-05-12 ENCOUNTER — HOSPITAL ENCOUNTER (EMERGENCY)
Facility: HOSPITAL | Age: 27
Discharge: HOME | End: 2022-05-12
Attending: EMERGENCY MEDICINE
Payer: MEDICAID

## 2022-05-12 VITALS
TEMPERATURE: 99 F | BODY MASS INDEX: 27.98 KG/M2 | SYSTOLIC BLOOD PRESSURE: 100 MMHG | RESPIRATION RATE: 18 BRPM | OXYGEN SATURATION: 97 % | HEART RATE: 72 BPM | DIASTOLIC BLOOD PRESSURE: 70 MMHG | WEIGHT: 153 LBS

## 2022-05-12 DIAGNOSIS — S99.929A FOOT INJURY: ICD-10-CM

## 2022-05-12 DIAGNOSIS — T14.8XXA PUNCTURE WOUND: Primary | ICD-10-CM

## 2022-05-12 PROCEDURE — 90715 TDAP VACCINE 7 YRS/> IM: CPT | Performed by: EMERGENCY MEDICINE

## 2022-05-12 PROCEDURE — 25000003 PHARM REV CODE 250: Performed by: EMERGENCY MEDICINE

## 2022-05-12 PROCEDURE — 63600175 PHARM REV CODE 636 W HCPCS: Performed by: EMERGENCY MEDICINE

## 2022-05-12 PROCEDURE — 90471 IMMUNIZATION ADMIN: CPT | Performed by: EMERGENCY MEDICINE

## 2022-05-12 PROCEDURE — 99284 EMERGENCY DEPT VISIT MOD MDM: CPT | Mod: 25

## 2022-05-12 RX ORDER — LIDOCAINE AND PRILOCAINE 25; 25 MG/G; MG/G
CREAM TOPICAL
Status: COMPLETED | OUTPATIENT
Start: 2022-05-12 | End: 2022-05-12

## 2022-05-12 RX ORDER — ACETAMINOPHEN 500 MG
1000 TABLET ORAL
Status: COMPLETED | OUTPATIENT
Start: 2022-05-12 | End: 2022-05-12

## 2022-05-12 RX ADMIN — ACETAMINOPHEN 1000 MG: 500 TABLET ORAL at 09:05

## 2022-05-12 RX ADMIN — LIDOCAINE AND PRILOCAINE: 25; 25 CREAM TOPICAL at 09:05

## 2022-05-12 RX ADMIN — TETANUS TOXOID, REDUCED DIPHTHERIA TOXOID AND ACELLULAR PERTUSSIS VACCINE, ADSORBED 0.5 ML: 5; 2.5; 8; 8; 2.5 SUSPENSION INTRAMUSCULAR at 09:05

## 2022-05-13 NOTE — ED PROVIDER NOTES
EMERGENCY DEPARTMENT HISTORY AND PHYSICAL EXAM          Date: 5/12/2022   Patient Name: Shannan Guajardo       History of Presenting Illness           Chief Complaint   Patient presents with    Foot Injury     Pt stated she stepped on a spring from an old box spring mattress. Pt tetanus is not up to date. Pt has a small puncture wound on her left heel. No bleeding at time of triage.          History Provided By: Patient    2137   Shannan Guajardo is a 26 y.o. female with PMHX of  denies significant history who presents to the emergency department C/O foot injury.    Patient reports she punctured her left heel when she stepped on a spring from old box spring mattress.  States spring penetrated through her foot and she pulled it out.  Unknown last tetanus.  Complaining of pain to foot.      PCP: [facility]        No current facility-administered medications for this encounter.     Current Outpatient Medications   Medication Sig Dispense Refill    doxylamine-pyridoxine, vit B6, (DICLEGIS) 10-10 mg TbEC Take 2 tablets by mouth every evening. 28 tablet 0    loratadine (CLARITIN) 10 mg tablet Take 1 tablet (10 mg total) by mouth once daily. 30 tablet 0    norgestimate-ethinyl estradiol (ORTHO-CYCLEN) 0.25-35 mg-mcg per tablet       ondansetron (ZOFRAN) 4 MG tablet Take 1 tablet (4 mg total) by mouth every 6 (six) hours. 12 tablet 0    ondansetron (ZOFRAN-ODT) 4 MG TbDL Take 1 tablet (4 mg total) by mouth every 6 (six) hours as needed (nausea). 12 tablet 0    prenatal 21-iron fu-folic acid (PRENATAL COMPLETE) 14 mg iron- 400 mcg Tab Take 1 tablet by mouth once daily. 30 tablet 0           Past History     Past Medical History:   Past Medical History:   Diagnosis Date    Anxiety     Depression         Past Surgical History:   Past Surgical History:   Procedure Laterality Date    CHOLECYSTECTOMY          Family History:   Family History   Problem Relation Age of Onset    Alcohol abuse  Mother     COPD Mother     Arthritis Father         Social History:   Social History     Tobacco Use    Smoking status: Former Smoker    Smokeless tobacco: Never Used   Substance Use Topics    Alcohol use: Not Currently     Alcohol/week: 0.0 standard drinks    Drug use: No        Allergies:   Review of patient's allergies indicates:   Allergen Reactions    Amoxicillin Swelling    Naproxen Nausea And Vomiting    Neurontin [gabapentin] Nausea And Vomiting    Toradol [ketorolac] Nausea And Vomiting    Tramadol Nausea And Vomiting          Review of Systems   Review of Systems   Constitutional: Negative for chills and fever.   Skin: Positive for wound.   All other systems reviewed and are negative.               Physical Exam     Vitals:    05/12/22 2115 05/12/22 2116   BP: 100/70    Pulse: 72    Resp: 18    Temp: 98.5 °F (36.9 °C)    SpO2: 97%    Weight:  69.4 kg (153 lb)      Physical Exam  Vitals and nursing note reviewed.   Constitutional:       General: She is not in acute distress.     Appearance: Normal appearance. She is not ill-appearing.   HENT:      Head: Normocephalic and atraumatic.      Nose: Nose normal. No congestion or rhinorrhea.      Mouth/Throat:      Mouth: Mucous membranes are moist.   Eyes:      Extraocular Movements: Extraocular movements intact.      Pupils: Pupils are equal, round, and reactive to light.   Cardiovascular:      Rate and Rhythm: Regular rhythm.   Pulmonary:      Effort: Pulmonary effort is normal. No respiratory distress.   Musculoskeletal:         General: No tenderness or deformity. Normal range of motion.      Cervical back: Normal range of motion.   Feet:      Comments: Small puncture wound to left heel  Skin:     General: Skin is warm and dry.   Neurological:      General: No focal deficit present.      Mental Status: She is alert and oriented to person, place, and time. Mental status is at baseline.   Psychiatric:         Mood and Affect: Mood normal.          Behavior: Behavior normal.              Diagnostic Study Results      Labs -   No results found for this or any previous visit (from the past 12 hour(s)).     Radiologic Studies -    X-Ray Foot Complete Left    (Results Pending)        Medications given in the ED-   Medications   Tdap (BOOSTRIX) vaccine injection 0.5 mL (0.5 mLs Intramuscular Given 5/12/22 2155)   acetaminophen tablet 1,000 mg (1,000 mg Oral Given 5/12/22 2155)   LIDOcaine-prilocaine cream ( Topical (Top) Given 5/12/22 2155)           Medical Decision Making    I am the first provider for this patient.     I reviewed the vital signs, available nursing notes, past medical history, past surgical history, family history and social history.     Vital Signs:  Reviewed the patient's vital signs.     Pulse Oximetry Analysis and Interpretation:    97% on Room Air, normal        Records Reviewed: Nursing notes.        Provider Notes (Medical Decision Making): Shannan Guajardo is a 26 y.o. female here with puncture injury to left heel.  No foreign body appreciated.  Will get x-ray to evaluate for retained products, update tetanus, treat for pain.      Procedures:   Procedures      ED Course:    No retained foreign body seen on radiographs  Wound cleaned and dressed.  Patient to return to ER if she develops any signs of a infection.           Diagnosis and Disposition     Critical Care:      DISCHARGE NOTE:       Shannan Guajardo's  results have been reviewed with her.  She has been counseled regarding her diagnosis, treatment, and plan.  She verbally conveys understanding and agreement of the signs, symptoms, diagnosis, treatment and prognosis and additionally agrees to follow up as discussed.  She also agrees with the care-plan and conveys that all of her questions have been answered.  I have also provided discharge instructions for her that include: educational information regarding their diagnosis and treatment, and list of reasons why they would  want to return to the ED prior to their follow-up appointment, should her condition change. She has been provided with education for proper emergency department utilization.         CLINICAL IMPRESSION:         1. Puncture wound    2. Foot injury              PLAN:   1. Discharge Home  2.      Medication List      ASK your doctor about these medications    doxylamine-pyridoxine (vit B6) 10-10 mg Tbec  Commonly known as: DICLEGIS  Take 2 tablets by mouth every evening.     loratadine 10 mg tablet  Commonly known as: CLARITIN  Take 1 tablet (10 mg total) by mouth once daily.     norgestimate-ethinyl estradioL 0.25-35 mg-mcg per tablet  Commonly known as: ORTHO-CYCLEN     ondansetron 4 MG tablet  Commonly known as: ZOFRAN  Take 1 tablet (4 mg total) by mouth every 6 (six) hours.     ondansetron 4 MG Tbdl  Commonly known as: ZOFRAN-ODT  Take 1 tablet (4 mg total) by mouth every 6 (six) hours as needed (nausea).     PRENATAL COMPLETE 14 mg iron- 400 mcg Tab  Generic drug: prenatal 21-iron fu-folic acid  Take 1 tablet by mouth once daily.           3. [address] [address]  [phone]    Schedule an appointment as soon as possible for a visit in 1 week  Wound check    [facility] Emergency Department  [address] [place] [address] [id number] [phone]  Go to   If symptoms worsen       _______________________________     Please note that this dictation was completed with M*Likelii, the computer voice recognition software.  Quite often unanticipated grammatical, syntax, homophones, and other interpretive errors are inadvertently transcribed by the computer software.  Please disregard these errors.  Please excuse any errors that have escaped final proofreading.             Sanford Rojas MD  05/12/22 0546

## 2022-11-07 ENCOUNTER — HOSPITAL ENCOUNTER (EMERGENCY)
Facility: HOSPITAL | Age: 27
Discharge: HOME | End: 2022-11-07
Attending: EMERGENCY MEDICINE
Payer: MEDICAID

## 2022-11-07 VITALS
BODY MASS INDEX: 27.23 KG/M2 | TEMPERATURE: 98 F | DIASTOLIC BLOOD PRESSURE: 62 MMHG | SYSTOLIC BLOOD PRESSURE: 103 MMHG | RESPIRATION RATE: 16 BRPM | HEART RATE: 80 BPM | OXYGEN SATURATION: 97 % | HEIGHT: 62 IN | WEIGHT: 148 LBS

## 2022-11-07 DIAGNOSIS — M54.50 ACUTE MIDLINE LOW BACK PAIN WITHOUT SCIATICA: Primary | ICD-10-CM

## 2022-11-07 DIAGNOSIS — M54.6 THORACIC BACK PAIN: ICD-10-CM

## 2022-11-07 LAB
B-HCG UR QL: NEGATIVE
BACTERIA #/AREA URNS HPF: NEGATIVE /HPF
BILIRUB UR QL STRIP: NEGATIVE
CLARITY UR: CLEAR
COLOR UR: YELLOW
GLUCOSE UR QL STRIP: NEGATIVE
HGB UR QL STRIP: NEGATIVE
HYALINE CASTS #/AREA URNS LPF: 2.7 /LPF
KETONES UR QL STRIP: NEGATIVE
LEUKOCYTE ESTERASE UR QL STRIP: ABNORMAL
MICROSCOPIC COMMENT: ABNORMAL
NITRITE UR QL STRIP: NEGATIVE
PH UR STRIP: 6 [PH] (ref 5–8)
PROT UR QL STRIP: NEGATIVE
RBC #/AREA URNS HPF: 2 /HPF (ref 0–4)
SP GR UR STRIP: 1.01 (ref 1–1.03)
SQUAMOUS #/AREA URNS HPF: 22 /HPF
URN SPEC COLLECT METH UR: ABNORMAL
UROBILINOGEN UR STRIP-ACNC: 1 EU/DL
WBC #/AREA URNS HPF: 8 /HPF (ref 0–5)

## 2022-11-07 PROCEDURE — 81000 URINALYSIS NONAUTO W/SCOPE: CPT

## 2022-11-07 PROCEDURE — 99284 EMERGENCY DEPT VISIT MOD MDM: CPT

## 2022-11-07 PROCEDURE — 81025 URINE PREGNANCY TEST: CPT

## 2022-11-07 RX ORDER — CYCLOBENZAPRINE HCL 10 MG
10 TABLET ORAL 3 TIMES DAILY PRN
Qty: 15 TABLET | Refills: 0 | Status: SHIPPED | OUTPATIENT
Start: 2022-11-07 | End: 2022-11-12

## 2022-11-07 RX ORDER — ACETAMINOPHEN/DIPHENHYDRAMINE 325-12.5MG
1 TABLET ORAL EVERY 8 HOURS PRN
Qty: 21 EACH | Refills: 0 | Status: SHIPPED | OUTPATIENT
Start: 2022-11-07 | End: 2022-11-14

## 2022-11-07 NOTE — ED PROVIDER NOTES
Encounter Date: 11/7/2022       History     Chief Complaint   Patient presents with    Back Pain     I've had back trouble for a  while and yesterday started with a flare up. C/o entire back hurting.  Denies injury and denies urinary symptoms.     This note is dictated on M*Modal word recognition program.  There are word recognition mistakes and grammatical errors that are occasionally missed on review.     Shannan Guajardo is a 26 y.o. female patient reports thoracic and lower back pain since yesterday evening.  Patient denies trauma or injury to her back.  Patient denies any urinary symptoms at this time.  Patient denies pregnancy.    The history is provided by the patient.   Review of patient's allergies indicates:   Allergen Reactions    Amoxicillin Swelling    Naproxen Nausea And Vomiting    Neurontin [gabapentin] Nausea And Vomiting    Toradol [ketorolac] Nausea And Vomiting    Tramadol Nausea And Vomiting     Past Medical History:   Diagnosis Date    Anxiety     Depression      Past Surgical History:   Procedure Laterality Date    CHOLECYSTECTOMY       Family History   Problem Relation Age of Onset    Alcohol abuse Mother     COPD Mother     Arthritis Father      Social History     Tobacco Use    Smoking status: Former    Smokeless tobacco: Never   Substance Use Topics    Alcohol use: Not Currently     Alcohol/week: 0.0 standard drinks    Drug use: No     Review of Systems   Constitutional: Negative.    HENT: Negative.     Eyes: Negative.    Respiratory: Negative.     Cardiovascular: Negative.    Gastrointestinal: Negative.    Endocrine: Negative.    Genitourinary: Negative.    Musculoskeletal:  Positive for back pain.   Skin: Negative.    Allergic/Immunologic: Negative.    Neurological: Negative.    Hematological: Negative.    Psychiatric/Behavioral: Negative.       Physical Exam     Initial Vitals [11/07/22 1304]   BP Pulse Resp Temp SpO2   103/62 80 16 98.3 °F (36.8 °C) 97 %      MAP       --          Physical Exam    Constitutional: She appears well-developed and well-nourished. She is not diaphoretic. No distress.   Eyes: EOM are normal. Pupils are equal, round, and reactive to light. Right eye exhibits no discharge. Left eye exhibits no discharge. No scleral icterus.   Neck: Neck supple.   Normal range of motion.  Cardiovascular:  Normal rate and regular rhythm.           Pulmonary/Chest: Breath sounds normal. No respiratory distress. She has no wheezes. She has no rhonchi. She has no rales. She exhibits no tenderness.   Abdominal: Abdomen is soft. She exhibits no distension and no mass. There is no abdominal tenderness. There is no rebound and no guarding.   Musculoskeletal:         General: Tenderness (patient has tenderness to mid to lower back on palpation today.) present. Normal range of motion.      Cervical back: Normal range of motion and neck supple.     Neurological: She is alert and oriented to person, place, and time. GCS score is 15. GCS eye subscore is 4. GCS verbal subscore is 5. GCS motor subscore is 6.   Skin: Capillary refill takes less than 2 seconds. No rash noted. No erythema. No pallor.   Psychiatric: She has a normal mood and affect. Her behavior is normal. Judgment and thought content normal.       ED Course   Procedures  Labs Reviewed   URINALYSIS, REFLEX TO URINE CULTURE - Abnormal; Notable for the following components:       Result Value    Leukocytes, UA 1+ (*)     All other components within normal limits    Narrative:     Preferred Collection Type->Urine, Clean Catch  Specimen Source->Urine   URINALYSIS MICROSCOPIC - Abnormal; Notable for the following components:    WBC, UA 8 (*)     Hyaline Casts, UA 2.7 (*)     All other components within normal limits    Narrative:     Preferred Collection Type->Urine, Clean Catch  Specimen Source->Urine   PREGNANCY TEST, URINE RAPID    Narrative:     Specimen Source->Urine          Imaging Results              X-Ray Lumbar Spine Ap And  Lateral (In process)                      X-Ray Thoracic Spine AP Lateral (In process)  Result time 11/07/22 13:35:04                     Medications - No data to display  Medical Decision Making:   Differential Diagnosis:   Lumbar strain, thoracic strain, degenerative disc disease, herniated disc  Clinical Tests:   Lab Tests: Ordered and Reviewed  Radiological Study: Ordered and Reviewed  ED Management:  Patient does have some scoliosis noted to thoracic x-ray.  No obvious acute fractures and return back noted x-ray.  Patient instructed to take Flexeril and Tylenol to control pain at home.  The patient acknowledges that close follow up with medical provider is required. Instructed to follow up with PCP within 2 days. Patient was given specific return precautions. The patient agrees to comply with all instruction and directions given in the ER.                            Clinical Impression:   Final diagnoses:  [M54.6] Thoracic back pain  [M54.50] Acute midline low back pain without sciatica (Primary)      ED Disposition Condition    Discharge Stable          ED Prescriptions       Medication Sig Dispense Start Date End Date Auth. Provider    cyclobenzaprine (FLEXERIL) 10 MG tablet Take 1 tablet (10 mg total) by mouth 3 (three) times daily as needed for Muscle spasms. 15 tablet 11/7/2022 11/12/2022 Des Bearden NP    diphenhydrAMINE-acetaminophen (PERCOGESIC EXTRA STRENGTH) 12.5-500 mg Tab Take 1 tablet by mouth every 8 (eight) hours as needed (pain). 21 each 11/7/2022 11/14/2022 Des Bearden NP          Follow-up Information       Follow up With Specialties Details Why Contact Info    [facility] Psychology, Internal Medicine, Gynecology, Dental General Practice Schedule an appointment as soon as possible for a visit in 3 days  [address] [place] [id number]  [phone]               Des Bearden NP  11/07/22 5851

## 2022-11-15 ENCOUNTER — HOSPITAL ENCOUNTER (EMERGENCY)
Facility: HOSPITAL | Age: 27
Discharge: HOME | End: 2022-11-15
Attending: EMERGENCY MEDICINE
Payer: MEDICAID

## 2022-11-15 VITALS
DIASTOLIC BLOOD PRESSURE: 71 MMHG | BODY MASS INDEX: 27.23 KG/M2 | HEIGHT: 62 IN | HEART RATE: 92 BPM | SYSTOLIC BLOOD PRESSURE: 116 MMHG | OXYGEN SATURATION: 96 % | WEIGHT: 148 LBS | TEMPERATURE: 98 F | RESPIRATION RATE: 16 BRPM

## 2022-11-15 DIAGNOSIS — B34.9 VIRAL SYNDROME: Primary | ICD-10-CM

## 2022-11-15 DIAGNOSIS — Z20.828 EXPOSURE TO INFLUENZA: ICD-10-CM

## 2022-11-15 LAB
CTP QC/QA: YES
POC MOLECULAR INFLUENZA A AGN: NEGATIVE
POC MOLECULAR INFLUENZA B AGN: NEGATIVE

## 2022-11-15 PROCEDURE — 99284 EMERGENCY DEPT VISIT MOD MDM: CPT

## 2022-11-15 PROCEDURE — 87502 INFLUENZA DNA AMP PROBE: CPT

## 2022-11-15 RX ORDER — ONDANSETRON 4 MG/1
4 TABLET, ORALLY DISINTEGRATING ORAL EVERY 8 HOURS PRN
Qty: 12 TABLET | Refills: 0 | Status: SHIPPED | OUTPATIENT
Start: 2022-11-15 | End: 2022-11-18

## 2022-11-15 RX ORDER — OSELTAMIVIR PHOSPHATE 75 MG/1
75 CAPSULE ORAL 2 TIMES DAILY
Qty: 10 CAPSULE | Refills: 0 | Status: SHIPPED | OUTPATIENT
Start: 2022-11-15 | End: 2022-11-20

## 2022-11-15 RX ORDER — GUAIFENESIN/DEXTROMETHORPHAN 100-10MG/5
5 SYRUP ORAL EVERY 4 HOURS PRN
Qty: 118 ML | Refills: 0 | Status: SHIPPED | OUTPATIENT
Start: 2022-11-15 | End: 2022-11-25

## 2022-11-15 NOTE — ED PROVIDER NOTES
Encounter Date: 11/15/2022       History     Chief Complaint   Patient presents with    Cough    Generalized Body Aches     Pt stated that since last night she has been experiencing cough / bodyache / headache / congestion / sore throat / vomiting. Spouse with flu.      This is a 26-year-old white female with noncontributory past medical history who presents to the emergency department with concerns regarding influenza after known exposure.  Yesterday patient developed nonproductive cough associated with chest pain during coughing, generalized body aches, sore throat, and intermittent vomiting.  Her son is experiencing similar symptoms,  has the flu.  She denies any other relative symptoms at this time.    Review of patient's allergies indicates:   Allergen Reactions    Amoxicillin Swelling    Naproxen Nausea And Vomiting    Neurontin [gabapentin] Nausea And Vomiting    Toradol [ketorolac] Nausea And Vomiting    Tramadol Nausea And Vomiting     Past Medical History:   Diagnosis Date    Anxiety     Depression      Past Surgical History:   Procedure Laterality Date    CHOLECYSTECTOMY       Family History   Problem Relation Age of Onset    Alcohol abuse Mother     COPD Mother     Arthritis Father      Social History     Tobacco Use    Smoking status: Former    Smokeless tobacco: Never   Substance Use Topics    Alcohol use: Not Currently     Alcohol/week: 0.0 standard drinks    Drug use: No     Review of Systems   Constitutional:  Positive for chills. Negative for fever.   HENT:  Positive for congestion, rhinorrhea and sore throat.    Respiratory:  Positive for cough.    Cardiovascular: Negative.    Gastrointestinal:  Positive for nausea and vomiting. Negative for abdominal pain.   Musculoskeletal:  Positive for myalgias.     Physical Exam     Initial Vitals [11/15/22 1057]   BP Pulse Resp Temp SpO2   116/71 92 16 98.1 °F (36.7 °C) 96 %      MAP       --         Physical Exam    Nursing note and vitals  reviewed.  Constitutional: She appears well-developed and well-nourished. She is active. No distress.   HENT:   Head: Normocephalic and atraumatic.   Mouth/Throat: Oropharynx is clear and moist.   Eyes: EOM are normal. Pupils are equal, round, and reactive to light.   Neck: Neck supple.   Normal range of motion.  Cardiovascular:  Normal rate, regular rhythm and normal heart sounds.           Pulmonary/Chest: Breath sounds normal. No respiratory distress.   Abdominal: Abdomen is soft. Bowel sounds are normal. She exhibits no distension. There is no abdominal tenderness.   Musculoskeletal:         General: Normal range of motion.      Cervical back: Normal range of motion and neck supple.     Neurological: She is alert and oriented to person, place, and time. GCS score is 15. GCS eye subscore is 4. GCS verbal subscore is 5. GCS motor subscore is 6.   Skin: Skin is warm and dry. Capillary refill takes less than 2 seconds.   Psychiatric: She has a normal mood and affect. Her behavior is normal. Thought content normal.       ED Course   Procedures  Labs Reviewed   POCT INFLUENZA A/B MOLECULAR          Imaging Results    None          Medications - No data to display              ED Course as of 11/15/22 1200   Tue Nov 15, 2022   1158 Flu a and b neg, however will tx for flu given 2 family members in the home tested pos today as well.  [CB]      ED Course User Index  [CB] Beth Zavala NP                 Clinical Impression:   Final diagnoses:  [B34.9] Viral syndrome (Primary)  [Z20.828] Exposure to influenza      ED Disposition Condition    Discharge Stable          ED Prescriptions       Medication Sig Dispense Start Date End Date Auth. Provider    oseltamivir (TAMIFLU) 75 MG capsule Take 1 capsule (75 mg total) by mouth 2 (two) times daily. for 5 days 10 capsule 11/15/2022 11/20/2022 Beth Zavala NP    dextromethorphan-guaiFENesin  mg/5 ml (ROBITUSSIN-DM)  mg/5 mL liquid Take 5 mLs by mouth every  4 (four) hours as needed (as needed for cough). 118 mL 11/15/2022 11/25/2022 Beth Zavala NP    ondansetron (ZOFRAN-ODT) 4 MG TbDL Take 1 tablet (4 mg total) by mouth every 8 (eight) hours as needed (nausea). 12 tablet 11/15/2022 11/18/2022 Beth Zavala NP          Follow-up Information       Follow up With Specialties Details Why Contact Info    [facility] Psychology, Internal Medicine, Gynecology, Dental General Practice Schedule an appointment as soon as possible for a visit in 2 days for re-evaluation of today's complaint [address] [facility] [address]  [phone]               Beth Zavala NP  11/15/22 1205

## 2022-11-17 ENCOUNTER — PATIENT OUTREACH (OUTPATIENT)
Dept: EMERGENCY MEDICINE | Facility: HOSPITAL | Age: 27
End: 2022-11-17
Payer: MEDICAID

## 2022-11-17 NOTE — PROGRESS NOTES
Patient declined assistance making a follow-up appointment with her PCP at this time. Patient declined ED navigation assessment and denied any needs/resources at this time.      Massiel Pacheco  ED Navigator- [place]  [phone]

## 2022-11-20 ENCOUNTER — HOSPITAL ENCOUNTER (EMERGENCY)
Facility: HOSPITAL | Age: 27
Discharge: HOME | End: 2022-11-20
Attending: EMERGENCY MEDICINE
Payer: MEDICAID

## 2022-11-20 VITALS
DIASTOLIC BLOOD PRESSURE: 65 MMHG | HEART RATE: 95 BPM | RESPIRATION RATE: 18 BRPM | SYSTOLIC BLOOD PRESSURE: 112 MMHG | OXYGEN SATURATION: 98 % | TEMPERATURE: 98 F

## 2022-11-20 DIAGNOSIS — B96.89 BACTERIAL VAGINOSIS: ICD-10-CM

## 2022-11-20 DIAGNOSIS — N76.0 BACTERIAL VAGINOSIS: ICD-10-CM

## 2022-11-20 DIAGNOSIS — N30.90 CYSTITIS: Primary | ICD-10-CM

## 2022-11-20 LAB
B-HCG UR QL: NEGATIVE
BACTERIA #/AREA URNS HPF: NEGATIVE /HPF
BACTERIA GENITAL QL WET PREP: ABNORMAL
BILIRUB UR QL STRIP: NEGATIVE
C TRACH DNA SPEC QL NAA+PROBE: NOT DETECTED
CLARITY UR: CLEAR
CLUE CELLS VAG QL WET PREP: ABNORMAL
COLOR UR: YELLOW
FILAMENT FUNGI VAG WET PREP-#/AREA: ABNORMAL
GLUCOSE UR QL STRIP: NEGATIVE
HGB UR QL STRIP: ABNORMAL
HYALINE CASTS #/AREA URNS LPF: 1.2 /LPF
KETONES UR QL STRIP: NEGATIVE
LEUKOCYTE ESTERASE UR QL STRIP: ABNORMAL
MICROSCOPIC COMMENT: ABNORMAL
N GONORRHOEA DNA SPEC QL NAA+PROBE: NOT DETECTED
NITRITE UR QL STRIP: NEGATIVE
PH UR STRIP: 6 [PH] (ref 5–8)
PROT UR QL STRIP: NEGATIVE
RBC #/AREA URNS HPF: 1 /HPF (ref 0–4)
SP GR UR STRIP: <=1.005 (ref 1–1.03)
SPECIMEN SOURCE: ABNORMAL
SQUAMOUS #/AREA URNS HPF: 1 /HPF
T VAGINALIS GENITAL QL WET PREP: ABNORMAL
URN SPEC COLLECT METH UR: ABNORMAL
UROBILINOGEN UR STRIP-ACNC: 1 EU/DL
WBC #/AREA URNS HPF: 95 /HPF (ref 0–5)
WBC #/AREA VAG WET PREP: ABNORMAL
YEAST GENITAL QL WET PREP: ABNORMAL

## 2022-11-20 PROCEDURE — 87086 URINE CULTURE/COLONY COUNT: CPT | Performed by: EMERGENCY MEDICINE

## 2022-11-20 PROCEDURE — 81025 URINE PREGNANCY TEST: CPT | Performed by: EMERGENCY MEDICINE

## 2022-11-20 PROCEDURE — 87491 CHLMYD TRACH DNA AMP PROBE: CPT | Performed by: EMERGENCY MEDICINE

## 2022-11-20 PROCEDURE — 87591 N.GONORRHOEAE DNA AMP PROB: CPT | Performed by: EMERGENCY MEDICINE

## 2022-11-20 PROCEDURE — 99284 EMERGENCY DEPT VISIT MOD MDM: CPT

## 2022-11-20 PROCEDURE — 81000 URINALYSIS NONAUTO W/SCOPE: CPT | Performed by: EMERGENCY MEDICINE

## 2022-11-20 PROCEDURE — 87210 SMEAR WET MOUNT SALINE/INK: CPT | Performed by: EMERGENCY MEDICINE

## 2022-11-20 RX ORDER — PHENAZOPYRIDINE HYDROCHLORIDE 200 MG/1
200 TABLET, FILM COATED ORAL 3 TIMES DAILY
Qty: 6 TABLET | Refills: 0 | Status: SHIPPED | OUTPATIENT
Start: 2022-11-20 | End: 2022-11-30

## 2022-11-20 RX ORDER — CEPHALEXIN 500 MG/1
500 CAPSULE ORAL 4 TIMES DAILY
Qty: 20 CAPSULE | Refills: 0 | Status: SHIPPED | OUTPATIENT
Start: 2022-11-20 | End: 2022-11-25

## 2022-11-20 RX ORDER — METRONIDAZOLE 7.5 MG/G
5 GEL VAGINAL NIGHTLY
Qty: 5 APPLICATOR | Refills: 0 | Status: SHIPPED | OUTPATIENT
Start: 2022-11-20 | End: 2022-11-25

## 2022-11-20 NOTE — ED PROVIDER NOTES
EMERGENCY DEPARTMENT HISTORY AND PHYSICAL EXAM     This note is dictated on M*Modal word recognition program.  There are word recognition mistakes and grammatical errors that are occasionally missed on review.     Date: 11/20/2022   Patient Name: Shannan Guajardo       History of Presenting Illness           Chief Complaint   Patient presents with    Dysuria     Patient reports pain with urination         History Provided By: Patient    1630   Shannan Guajardo is a 26 y.o. female with PMHX of denies significant history who presents to the emergency department C/O dysuria.      Patient reports painful urination for about the past 2 weeks.  Also reports some lower back pain.  Feels like pressure when urinating and difficulty starting stream due to pain.  No fever.  Denies abnormal vaginal discharge.  No rash.     PCP: [facility]        No current facility-administered medications for this encounter.     Current Outpatient Medications   Medication Sig Dispense Refill    cephALEXin (KEFLEX) 500 MG capsule Take 1 capsule (500 mg total) by mouth 4 (four) times daily. for 5 days 20 capsule 0    dextromethorphan-guaiFENesin  mg/5 ml (ROBITUSSIN-DM)  mg/5 mL liquid Take 5 mLs by mouth every 4 (four) hours as needed (as needed for cough). 118 mL 0    doxylamine-pyridoxine, vit B6, (DICLEGIS) 10-10 mg TbEC Take 2 tablets by mouth every evening. 28 tablet 0    loratadine (CLARITIN) 10 mg tablet Take 1 tablet (10 mg total) by mouth once daily. 30 tablet 0    metroNIDAZOLE (METROGEL) 0.75 % (37.5mg/5 gram) vaginal gel Place 5 applicators vaginally every evening. for 5 days 5 applicator 0    norgestimate-ethinyl estradiol (ORTHO-CYCLEN) 0.25-35 mg-mcg per tablet       ondansetron (ZOFRAN) 4 MG tablet Take 1 tablet (4 mg total) by mouth every 6 (six) hours. 12 tablet 0    oseltamivir (TAMIFLU) 75 MG capsule Take 1 capsule (75 mg total) by mouth 2 (two) times daily. for 5 days 10 capsule 0     phenazopyridine (PYRIDIUM) 200 MG tablet Take 1 tablet (200 mg total) by mouth 3 (three) times daily. for 10 days 6 tablet 0    prenatal 21-iron fu-folic acid (PRENATAL COMPLETE) 14 mg iron- 400 mcg Tab Take 1 tablet by mouth once daily. 30 tablet 0           Past History     Past Medical History:   Past Medical History:   Diagnosis Date    Anxiety     Depression         Past Surgical History:   Past Surgical History:   Procedure Laterality Date    CHOLECYSTECTOMY          Family History:   Family History   Problem Relation Age of Onset    Alcohol abuse Mother     COPD Mother     Arthritis Father         Social History:   Social History     Tobacco Use    Smoking status: Former    Smokeless tobacco: Never   Substance Use Topics    Alcohol use: Not Currently     Alcohol/week: 0.0 standard drinks    Drug use: No        Allergies:   Review of patient's allergies indicates:   Allergen Reactions    Amoxicillin Swelling    Naproxen Nausea And Vomiting    Neurontin [gabapentin] Nausea And Vomiting    Toradol [ketorolac] Nausea And Vomiting    Tramadol Nausea And Vomiting          Review of Systems   Review of Systems   Constitutional:  Negative for fever.   HENT:  Negative for sore throat.    Respiratory:  Negative for shortness of breath.    Cardiovascular:  Negative for chest pain.   Gastrointestinal:  Negative for nausea.   Genitourinary:  Positive for difficulty urinating, dysuria and flank pain.   Musculoskeletal:  Negative for back pain.   Skin:  Negative for rash.   Neurological:  Negative for weakness.   Hematological:  Does not bruise/bleed easily.   All other systems reviewed and are negative.             Physical Exam     Vitals:    11/20/22 1528 11/20/22 1735   BP: 117/69 112/65   Pulse: 95    Resp: 18 18   Temp: 97.6 °F (36.4 °C)    SpO2: 98% 98%      Physical Exam  Vitals and nursing note reviewed.   Constitutional:       General: She is not in acute distress.     Appearance: Normal appearance. She is not  ill-appearing.   HENT:      Head: Normocephalic and atraumatic.      Right Ear: External ear normal.      Left Ear: External ear normal.      Nose: Nose normal. No congestion or rhinorrhea.      Mouth/Throat:      Mouth: Mucous membranes are moist.   Eyes:      Conjunctiva/sclera: Conjunctivae normal.      Pupils: Pupils are equal, round, and reactive to light.   Pulmonary:      Effort: Pulmonary effort is normal. No respiratory distress.   Abdominal:      General: There is no distension.      Palpations: Abdomen is soft.      Tenderness: There is no abdominal tenderness. There is no right CVA tenderness, left CVA tenderness, guarding or rebound.   Musculoskeletal:         General: No deformity. Normal range of motion.      Cervical back: Normal range of motion. No rigidity.   Skin:     General: Skin is dry.   Neurological:      General: No focal deficit present.      Mental Status: She is alert and oriented to person, place, and time. Mental status is at baseline.   Psychiatric:         Mood and Affect: Mood normal.         Behavior: Behavior normal.            Diagnostic Study Results      Labs -   Recent Results (from the past 12 hour(s))   Urinalysis, Reflex to Urine Culture Urine, Clean Catch    Collection Time: 11/20/22  3:49 PM    Specimen: Urine, Clean Catch   Result Value Ref Range    Specimen UA Urine, Clean Catch     Color, UA Yellow Yellow, Straw, Meme    Appearance, UA Clear Clear    pH, UA 6.0 5.0 - 8.0    Specific Gravity, UA <=1.005 (A) 1.005 - 1.030    Protein, UA Negative Negative    Glucose, UA Negative Negative    Ketones, UA Negative Negative    Bilirubin (UA) Negative Negative    Occult Blood UA 2+ (A) Negative    Nitrite, UA Negative Negative    Urobilinogen, UA 1.0 <2.0 EU/dL    Leukocytes, UA 2+ (A) Negative   Pregnancy, urine rapid    Collection Time: 11/20/22  3:49 PM   Result Value Ref Range    Preg Test, Ur Negative    Urinalysis Microscopic    Collection Time: 11/20/22  3:49 PM    Result Value Ref Range    RBC, UA 1 0 - 4 /hpf    WBC, UA 95 (H) 0 - 5 /hpf    Bacteria Negative None-Occ /hpf    Squam Epithel, UA 1 /hpf    Hyaline Casts, UA 1.2 (A) 0-1/lpf /lpf    Microscopic Comment SEE COMMENT    Vaginal Screen    Collection Time: 11/20/22  5:03 PM    Specimen: Vagina   Result Value Ref Range    Trichomonas None None    Clue Cells Rare (A) None    Budding Yeast None None    Fungal Hyphae None None    WBC - Vaginal Screen Few (A) None    Bacteria - Vaginal Screen Many (A) None    Wet Prep Source VAG         Radiologic Studies -    No orders to display        Medications given in the ED-   Medications - No data to display        Medical Decision Making    I am the first provider for this patient.     I reviewed the vital signs, available nursing notes, past medical history, past surgical history, family history and social history.     Vital Signs:  Reviewed the patient's vital signs.     Pulse Oximetry Analysis and Interpretation:    98% on Room Air, normal      Records Reviewed: Nursing notes.        Provider Notes (Medical Decision Making): Shannan Guajardo is a 26 y.o. female with dysuria.      Urinalysis reveals large amount of wbc's and 2+ leuks without nitrates bacteria.  Will also screen for STIs and BV.      Procedures:   Procedures      ED Course:    Small amount of clue cells on a vaginal swab.  Will also treat for BV.  STI testing pending waiting for results before empiric reatment.           Diagnosis and Disposition     Critical Care:      DISCHARGE NOTE:       Shannan Guajardo's  results have been reviewed with her.  She has been counseled regarding her diagnosis, treatment, and plan.  She verbally conveys understanding and agreement of the signs, symptoms, diagnosis, treatment and prognosis and additionally agrees to follow up as discussed.  She also agrees with the care-plan and conveys that all of her questions have been answered.  I have also provided discharge  instructions for her that include: educational information regarding their diagnosis and treatment, and list of reasons why they would want to return to the ED prior to their follow-up appointment, should her condition change. She has been provided with education for proper emergency department utilization.         CLINICAL IMPRESSION:         1. Cystitis    2. Bacterial vaginosis              PLAN:   1. Discharge Home  2.      Medication List        START taking these medications      cephALEXin 500 MG capsule  Commonly known as: KEFLEX  Take 1 capsule (500 mg total) by mouth 4 (four) times daily. for 5 days     metroNIDAZOLE 0.75 % (37.5mg/5 gram) vaginal gel  Commonly known as: METROGEL  Place 5 applicators vaginally every evening. for 5 days     phenazopyridine 200 MG tablet  Commonly known as: PYRIDIUM  Take 1 tablet (200 mg total) by mouth 3 (three) times daily. for 10 days            ASK your doctor about these medications      dextromethorphan-guaiFENesin  mg/5 ml  mg/5 mL liquid  Commonly known as: ROBITUSSIN-DM  Take 5 mLs by mouth every 4 (four) hours as needed (as needed for cough).     doxylamine-pyridoxine (vit B6) 10-10 mg Tbec  Commonly known as: DICLEGIS  Take 2 tablets by mouth every evening.     loratadine 10 mg tablet  Commonly known as: CLARITIN  Take 1 tablet (10 mg total) by mouth once daily.     norgestimate-ethinyl estradioL 0.25-35 mg-mcg per tablet  Commonly known as: ORTHO-CYCLEN     ondansetron 4 MG tablet  Commonly known as: ZOFRAN  Take 1 tablet (4 mg total) by mouth every 6 (six) hours.     oseltamivir 75 MG capsule  Commonly known as: TAMIFLU  Take 1 capsule (75 mg total) by mouth 2 (two) times daily. for 5 days     PRENATAL COMPLETE 14 mg iron- 400 mcg Tab  Generic drug: prenatal 21-iron fu-folic acid  Take 1 tablet by mouth once daily.               Where to Get Your Medications        These medications were sent to [facility] - [place], [place]  - [address]   [address] [facility] [address]      Phone: [phone]   cephALEXin 500 MG capsule  metroNIDAZOLE 0.75 % (37.5mg/5 gram) vaginal gel  phenazopyridine 200 MG tablet        [address]. [facility]  [address] [facility] [address]  [phone]    Schedule an appointment as soon as possible for a visit   Primary care follow up       _______________________________     Please note that this dictation was completed with [employer], the computer voice recognition software.  Quite often unanticipated grammatical, syntax, homophones, and other interpretive errors are inadvertently transcribed by the computer software.  Please disregard these errors.  Please excuse any errors that have escaped final proofreading.             Sanford Rojas MD  11/20/22 0502

## 2022-11-22 LAB
BACTERIA UR CULT: NORMAL
BACTERIA UR CULT: NORMAL

## 2022-12-12 ENCOUNTER — HOSPITAL ENCOUNTER (EMERGENCY)
Facility: HOSPITAL | Age: 27
Discharge: HOME | End: 2022-12-12
Attending: EMERGENCY MEDICINE
Payer: MEDICAID

## 2022-12-12 VITALS
SYSTOLIC BLOOD PRESSURE: 130 MMHG | RESPIRATION RATE: 18 BRPM | HEART RATE: 99 BPM | WEIGHT: 146 LBS | HEIGHT: 60 IN | DIASTOLIC BLOOD PRESSURE: 69 MMHG | OXYGEN SATURATION: 100 % | TEMPERATURE: 98 F | BODY MASS INDEX: 28.66 KG/M2

## 2022-12-12 DIAGNOSIS — R11.2 NAUSEA AND VOMITING, UNSPECIFIED VOMITING TYPE: Primary | ICD-10-CM

## 2022-12-12 LAB
ALBUMIN SERPL BCP-MCNC: 3.7 G/DL (ref 3.5–5.2)
ALP SERPL-CCNC: 70 U/L (ref 55–135)
ALT SERPL W/O P-5'-P-CCNC: 14 U/L (ref 10–44)
ANION GAP SERPL CALC-SCNC: 6 MMOL/L (ref 8–16)
AST SERPL-CCNC: 3 U/L (ref 10–40)
B-HCG UR QL: NEGATIVE
BASOPHILS # BLD AUTO: 0.07 K/UL (ref 0–0.2)
BASOPHILS NFR BLD: 0.7 % (ref 0–1.9)
BILIRUB SERPL-MCNC: 0.4 MG/DL (ref 0.1–1)
BUN SERPL-MCNC: 9 MG/DL (ref 6–20)
CALCIUM SERPL-MCNC: 8.9 MG/DL (ref 8.7–10.5)
CHLORIDE SERPL-SCNC: 107 MMOL/L (ref 95–110)
CO2 SERPL-SCNC: 26 MMOL/L (ref 23–29)
CREAT SERPL-MCNC: 0.7 MG/DL (ref 0.5–1.4)
DIFFERENTIAL METHOD: ABNORMAL
EOSINOPHIL # BLD AUTO: 0.2 K/UL (ref 0–0.5)
EOSINOPHIL NFR BLD: 1.9 % (ref 0–8)
ERYTHROCYTE [DISTWIDTH] IN BLOOD BY AUTOMATED COUNT: 13.6 % (ref 11.5–14.5)
EST. GFR  (NO RACE VARIABLE): >60 ML/MIN/1.73 M^2
GLUCOSE SERPL-MCNC: 103 MG/DL (ref 70–110)
HCG INTACT+B SERPL-ACNC: 1.4 MIU/ML
HCT VFR BLD AUTO: 35.2 % (ref 37–48.5)
HGB BLD-MCNC: 11.3 G/DL (ref 12–16)
IMM GRANULOCYTES # BLD AUTO: 0.04 K/UL (ref 0–0.04)
IMM GRANULOCYTES NFR BLD AUTO: 0.4 % (ref 0–0.5)
LYMPHOCYTES # BLD AUTO: 0.9 K/UL (ref 1–4.8)
LYMPHOCYTES NFR BLD: 9.6 % (ref 18–48)
MCH RBC QN AUTO: 27.9 PG (ref 27–31)
MCHC RBC AUTO-ENTMCNC: 32.1 G/DL (ref 32–36)
MCV RBC AUTO: 87 FL (ref 82–98)
MONOCYTES # BLD AUTO: 0.8 K/UL (ref 0.3–1)
MONOCYTES NFR BLD: 8.5 % (ref 4–15)
NEUTROPHILS # BLD AUTO: 7.8 K/UL (ref 1.8–7.7)
NEUTROPHILS NFR BLD: 78.9 % (ref 38–73)
NRBC BLD-RTO: 0 /100 WBC
PLATELET # BLD AUTO: 280 K/UL (ref 150–450)
PMV BLD AUTO: 10.1 FL (ref 9.2–12.9)
POTASSIUM SERPL-SCNC: 3.3 MMOL/L (ref 3.5–5.1)
PROT SERPL-MCNC: 6.9 G/DL (ref 6–8.4)
RBC # BLD AUTO: 4.05 M/UL (ref 4–5.4)
SODIUM SERPL-SCNC: 139 MMOL/L (ref 136–145)
WBC # BLD AUTO: 9.83 K/UL (ref 3.9–12.7)

## 2022-12-12 PROCEDURE — 81025 URINE PREGNANCY TEST: CPT

## 2022-12-12 PROCEDURE — 80053 COMPREHEN METABOLIC PANEL: CPT

## 2022-12-12 PROCEDURE — 85025 COMPLETE CBC W/AUTO DIFF WBC: CPT

## 2022-12-12 PROCEDURE — 99283 EMERGENCY DEPT VISIT LOW MDM: CPT | Mod: 25

## 2022-12-12 PROCEDURE — 84702 CHORIONIC GONADOTROPIN TEST: CPT

## 2022-12-12 PROCEDURE — 25000003 PHARM REV CODE 250

## 2022-12-12 PROCEDURE — 36415 COLL VENOUS BLD VENIPUNCTURE: CPT

## 2022-12-12 RX ORDER — ONDANSETRON 4 MG/1
4 TABLET, ORALLY DISINTEGRATING ORAL EVERY 8 HOURS PRN
Qty: 20 TABLET | Refills: 0 | Status: SHIPPED | OUTPATIENT
Start: 2022-12-12

## 2022-12-12 RX ORDER — POTASSIUM CHLORIDE 20 MEQ/1
20 TABLET, EXTENDED RELEASE ORAL
Status: COMPLETED | OUTPATIENT
Start: 2022-12-12 | End: 2022-12-12

## 2022-12-12 RX ORDER — ONDANSETRON 4 MG/1
4 TABLET, ORALLY DISINTEGRATING ORAL
Status: COMPLETED | OUTPATIENT
Start: 2022-12-12 | End: 2022-12-12

## 2022-12-12 RX ADMIN — ONDANSETRON 4 MG: 4 TABLET, ORALLY DISINTEGRATING ORAL at 05:12

## 2022-12-12 RX ADMIN — POTASSIUM CHLORIDE 20 MEQ: 1500 TABLET, EXTENDED RELEASE ORAL at 05:12

## 2022-12-12 NOTE — ED PROVIDER NOTES
Encounter Date: 12/12/2022       History     Chief Complaint   Patient presents with    Vomiting     Pt reports for the last five days she has been weak with nausea and vomiting, denies any other symptoms     This note is dictated on M*Modal word recognition program.  There are word recognition mistakes and grammatical errors that are occasionally missed on review.     Shannan Guajardo is a 27 y.o. female presents to ER today with complaints of 5 days of nausea vomiting.  Patient denies any abdominal pain.  She reports extreme nausea for the last 5 days.  She reports she is having trouble keeping down liquids and solid foods at home for the last 5 days.  Patient reports last time she felt like this she was pregnant.  Patient denies any diarrhea this time.  Patient reports she was supposed to start her cycle yesterday but did not come down yet.      The history is provided by the patient.   Review of patient's allergies indicates:   Allergen Reactions    Amoxicillin Swelling    Naproxen Nausea And Vomiting    Neurontin [gabapentin] Nausea And Vomiting    Toradol [ketorolac] Nausea And Vomiting    Tramadol Nausea And Vomiting     Past Medical History:   Diagnosis Date    Anxiety     Depression      Past Surgical History:   Procedure Laterality Date    CHOLECYSTECTOMY       Family History   Problem Relation Age of Onset    Alcohol abuse Mother     COPD Mother     Arthritis Father      Social History     Tobacco Use    Smoking status: Former    Smokeless tobacco: Never   Substance Use Topics    Alcohol use: Not Currently     Alcohol/week: 0.0 standard drinks    Drug use: No     Review of Systems   Constitutional: Negative.    HENT: Negative.     Eyes: Negative.    Respiratory: Negative.     Cardiovascular: Negative.    Gastrointestinal:  Positive for nausea and vomiting.   Endocrine: Negative.    Genitourinary: Negative.    Musculoskeletal: Negative.    Skin: Negative.    Allergic/Immunologic: Negative.     Neurological: Negative.    Hematological: Negative.    Psychiatric/Behavioral: Negative.       Physical Exam     Initial Vitals [12/12/22 1630]   BP Pulse Resp Temp SpO2   130/69 99 18 98.4 °F (36.9 °C) 100 %      MAP       --         Physical Exam    Constitutional: She appears well-developed and well-nourished. She is not diaphoretic.   HENT:   Right Ear: External ear normal.   Left Ear: External ear normal.   Eyes: Pupils are equal, round, and reactive to light. Right eye exhibits no discharge. Left eye exhibits no discharge.   Neck: Neck supple.   Normal range of motion.  Cardiovascular:  Normal rate and regular rhythm.           No murmur heard.  Pulmonary/Chest: Breath sounds normal. No respiratory distress. She has no wheezes. She has no rhonchi. She has no rales.   Abdominal: Abdomen is soft. She exhibits no distension. There is no abdominal tenderness. There is no rebound and no guarding.   Musculoskeletal:         General: No tenderness or edema. Normal range of motion.      Cervical back: Normal range of motion and neck supple.     Neurological: She is alert and oriented to person, place, and time. GCS score is 15. GCS eye subscore is 4. GCS verbal subscore is 5. GCS motor subscore is 6.   Skin: Capillary refill takes less than 2 seconds. No rash and no abscess noted. No erythema. No pallor.   Psychiatric: She has a normal mood and affect. Her behavior is normal. Judgment and thought content normal.       ED Course   Procedures  Labs Reviewed   CBC W/ AUTO DIFFERENTIAL - Abnormal; Notable for the following components:       Result Value    Hemoglobin 11.3 (*)     Hematocrit 35.2 (*)     Gran # (ANC) 7.8 (*)     Lymph # 0.9 (*)     Gran % 78.9 (*)     Lymph % 9.6 (*)     All other components within normal limits    Narrative:     Release to patient->Immediate   COMPREHENSIVE METABOLIC PANEL - Abnormal; Notable for the following components:    Potassium 3.3 (*)     AST 3 (*)     Anion Gap 6 (*)     All  other components within normal limits    Narrative:     Release to patient->Immediate   PREGNANCY TEST, URINE RAPID    Narrative:     Specimen Source->Urine   HCG, QUANTITATIVE    Narrative:     Release to patient->Immediate          Imaging Results    None          Medications   ondansetron disintegrating tablet 4 mg (4 mg Oral Given 12/12/22 1719)   potassium chloride SA CR tablet 20 mEq (20 mEq Oral Given 12/12/22 1740)     Medical Decision Making:   Differential Diagnosis:   Pregnancy, viral gastroenteritis, food poisoning, gastritis, gastroparesis  Clinical Tests:   Lab Tests: Ordered and Reviewed  ED Management:    Patient's potassium was 3.3 today in ER.  Replaced with oral potassium supplement while in ER  Patient's nausea vomiting treated with Zofran.    CBC and CMP reveals no other acute abnormalities.    Urinalysis revealed no acute urinary tract infection and patient's urine pregnancy test negative and blood pregnancy test was also negative.  Patient instructed follow-up with PCP for nausea vomiting.  Patient stable at time of discharge in no acute distress.  No life-threatening illnesses were found during ER visit today.  Patient was instructed to follow-up with PCP or other recommended specialist within the next 48-72 hours.  Patient was instructed to return to ER immediately for any worsening or concerning symptoms.  All discharge instructions discussed with patient, and patient agrees to comply with discharge instructions given today.                         Clinical Impression:   Final diagnoses:  [R11.2] Nausea and vomiting, unspecified vomiting type (Primary)        ED Disposition Condition    Discharge Stable          ED Prescriptions       Medication Sig Dispense Start Date End Date Auth. Provider    ondansetron (ZOFRAN-ODT) 4 MG TbDL Take 1 tablet (4 mg total) by mouth every 8 (eight) hours as needed (nausea/vomiting). 20 tablet 12/12/2022 -- Des Bearden NP          Follow-up Information     None          Des Bearden, MADIHA  12/12/22 0683

## 2023-01-20 ENCOUNTER — HOSPITAL ENCOUNTER (EMERGENCY)
Facility: HOSPITAL | Age: 28
Discharge: HOME | End: 2023-01-20
Attending: EMERGENCY MEDICINE
Payer: MEDICAID

## 2023-01-20 VITALS
SYSTOLIC BLOOD PRESSURE: 124 MMHG | WEIGHT: 150 LBS | BODY MASS INDEX: 29.45 KG/M2 | HEIGHT: 60 IN | OXYGEN SATURATION: 100 % | DIASTOLIC BLOOD PRESSURE: 74 MMHG | TEMPERATURE: 98 F | HEART RATE: 78 BPM | RESPIRATION RATE: 16 BRPM

## 2023-01-20 DIAGNOSIS — Z34.90 PREGNANCY, UNSPECIFIED GESTATIONAL AGE: ICD-10-CM

## 2023-01-20 DIAGNOSIS — Z32.01 POSITIVE PREGNANCY TEST: Primary | ICD-10-CM

## 2023-01-20 LAB — B-HCG UR QL: POSITIVE

## 2023-01-20 PROCEDURE — 99284 EMERGENCY DEPT VISIT MOD MDM: CPT | Mod: 25

## 2023-01-20 PROCEDURE — 81025 URINE PREGNANCY TEST: CPT

## 2023-01-20 RX ORDER — FAMOTIDINE 20 MG/1
20 TABLET, FILM COATED ORAL 2 TIMES DAILY
Qty: 20 TABLET | Refills: 0 | Status: SHIPPED | OUTPATIENT
Start: 2023-01-20 | End: 2023-01-20 | Stop reason: SDUPTHER

## 2023-01-20 RX ORDER — DOXYLAMINE SUCCINATE AND PYRIDOXINE HYDROCHLORIDE, DELAYED RELEASE TABLETS 10 MG/10 MG 10; 10 MG/1; MG/1
2 TABLET, DELAYED RELEASE ORAL NIGHTLY
Qty: 28 TABLET | Refills: 0 | Status: ON HOLD | OUTPATIENT
Start: 2023-01-20 | End: 2023-08-16 | Stop reason: HOSPADM

## 2023-01-20 RX ORDER — FAMOTIDINE 20 MG/1
20 TABLET, FILM COATED ORAL 2 TIMES DAILY
Qty: 20 TABLET | Refills: 0 | Status: SHIPPED | OUTPATIENT
Start: 2023-01-20 | End: 2023-01-30

## 2023-01-20 RX ORDER — FAMOTIDINE 20 MG
1 TABLET ORAL DAILY
Qty: 30 TABLET | Refills: 0 | COMMUNITY
Start: 2023-01-20 | End: 2024-01-20

## 2023-01-20 NOTE — DISCHARGE INSTRUCTIONS
Take your prenatal vitamins daily.  You could take the Diclegis either 2 pills at night or 1 pill at night and 1 pill in the morning.  Take the Pepcid twice a day for acid reflux.  Drink plenty of fluids.  Follow up with her OBGYN as scheduled.

## 2023-01-20 NOTE — ED PROVIDER NOTES
"Encounter Date: 1/20/2023       History     Chief Complaint   Patient presents with    Possible Pregnancy     Patient reports "late for period so I came straight here." LMP 12/19/22. Also reports nausea.      27-year-old female to ED for pregnancy test.  LMP was 12/19/2022.  She reports nausea and intermittent dizziness.    The history is provided by the patient.   Review of patient's allergies indicates:   Allergen Reactions    Amoxicillin Swelling    Naproxen Nausea And Vomiting    Neurontin [gabapentin] Nausea And Vomiting    Toradol [ketorolac] Nausea And Vomiting    Tramadol Nausea And Vomiting     Past Medical History:   Diagnosis Date    Anxiety     Depression      Past Surgical History:   Procedure Laterality Date    CHOLECYSTECTOMY       Family History   Problem Relation Age of Onset    Alcohol abuse Mother     COPD Mother     Arthritis Father      Social History     Tobacco Use    Smoking status: Former    Smokeless tobacco: Never   Substance Use Topics    Alcohol use: Not Currently     Alcohol/week: 0.0 standard drinks    Drug use: No     Review of Systems   Constitutional:  Negative for fever.   HENT:  Negative for sore throat.    Eyes: Negative.    Respiratory:  Negative for shortness of breath.    Cardiovascular:  Negative for chest pain.   Gastrointestinal:  Positive for nausea. Negative for abdominal pain.   Endocrine: Negative.    Genitourinary:  Negative for difficulty urinating, dysuria, frequency, vaginal bleeding, vaginal discharge and vaginal pain.   Musculoskeletal:  Negative for back pain.   Skin:  Negative for rash and wound.   Allergic/Immunologic: Negative.    Neurological:  Positive for dizziness. Negative for weakness.   Hematological:  Does not bruise/bleed easily.   Psychiatric/Behavioral: Negative.       Physical Exam     Initial Vitals [01/20/23 1256]   BP Pulse Resp Temp SpO2   124/74 78 16 97.5 °F (36.4 °C) 100 %      MAP       --         Physical Exam    Nursing note and vitals " reviewed.  Constitutional: She appears well-developed and well-nourished.   HENT:   Head: Normocephalic and atraumatic.   Eyes: EOM are normal. Pupils are equal, round, and reactive to light.   Neck: Neck supple.   Normal range of motion.  Cardiovascular:  Normal rate and regular rhythm.           Pulmonary/Chest: No respiratory distress.   Abdominal: She exhibits no distension. There is no abdominal tenderness.   Musculoskeletal:         General: Normal range of motion.      Cervical back: Normal range of motion and neck supple.     Neurological: She is alert and oriented to person, place, and time.   Skin: Skin is warm and dry.   Psychiatric: She has a normal mood and affect. Thought content normal.       ED Course   Procedures  Labs Reviewed   PREGNANCY TEST, URINE RAPID - Abnormal; Notable for the following components:       Result Value    Preg Test, Ur Positive (*)     All other components within normal limits    Narrative:     Specimen Source->Urine          Imaging Results    None          Medications - No data to display  Medical Decision Making:   Clinical Tests:   Lab Tests: Ordered and Reviewed  ED Management:  27-year-old female to ED for a pregnancy test.  She tested positive today.  She was .  LMP was 2022.  She does have an appointment scheduled with her OBGYN for next week.  She denies any abdominal pain, vaginal bleeding, pelvic pain, urinary symptoms.  She denies any fevers.  She reports she was not had improvement in nausea symptoms with Zofran.  No respiratory distress is noted.  She was instructed to attend her OBGYN patient without fail.                        Clinical Impression:   Final diagnoses:  [Z32.01] Positive pregnancy test (Primary)  [Z34.90] Pregnancy, unspecified gestational age        ED Disposition Condition    Discharge Stable          ED Prescriptions       Medication Sig Dispense Start Date End Date Auth. Provider    doxylamine-pyridoxine, vit B6, (DICLEGIS) 10-10 mg  TbEC Take 2 tablets by mouth every evening. 28 tablet 1/20/2023 -- Isidro Joseph NP    prenatal 21-iron fu-folic acid (PRENATAL COMPLETE) 14 mg iron- 400 mcg Tab Take 1 tablet by mouth once daily. 30 tablet 1/20/2023 1/20/2024 Isidro Joseph NP    famotidine (PEPCID) 20 MG tablet  (Status: Discontinued) Take 1 tablet (20 mg total) by mouth 2 (two) times daily. for 10 days 20 tablet 1/20/2023 1/20/2023 Isidro Joseph NP    famotidine (PEPCID) 20 MG tablet Take 1 tablet (20 mg total) by mouth 2 (two) times daily. for 10 days 20 tablet 1/20/2023 1/30/2023 Isidro Joseph NP          Follow-up Information       Follow up With Specialties Details Why Contact Info    [facility] Psychology, Internal Medicine, Gynecology, Dental General Practice In 2 days  [address] [facility] [address]  [phone]               Isidro Joseph NP  01/20/23 4774

## 2023-05-09 ENCOUNTER — HOSPITAL ENCOUNTER (EMERGENCY)
Facility: HOSPITAL | Age: 28
Discharge: HOME | End: 2023-05-09
Attending: EMERGENCY MEDICINE
Payer: MEDICAID

## 2023-05-09 VITALS
RESPIRATION RATE: 16 BRPM | BODY MASS INDEX: 29.33 KG/M2 | HEART RATE: 83 BPM | OXYGEN SATURATION: 99 % | SYSTOLIC BLOOD PRESSURE: 141 MMHG | TEMPERATURE: 99 F | HEIGHT: 60 IN | WEIGHT: 149.38 LBS | DIASTOLIC BLOOD PRESSURE: 79 MMHG

## 2023-05-09 DIAGNOSIS — Z51.89 VISIT FOR WOUND CHECK: Primary | ICD-10-CM

## 2023-05-09 PROCEDURE — 99283 EMERGENCY DEPT VISIT LOW MDM: CPT

## 2023-05-09 PROCEDURE — 25000003 PHARM REV CODE 250: Performed by: CLINICAL NURSE SPECIALIST

## 2023-05-09 RX ORDER — MUPIROCIN 20 MG/G
1 OINTMENT TOPICAL
Status: COMPLETED | OUTPATIENT
Start: 2023-05-09 | End: 2023-05-09

## 2023-05-09 RX ADMIN — MUPIROCIN 22 G: 20 OINTMENT TOPICAL at 01:05

## 2023-05-09 NOTE — DISCHARGE INSTRUCTIONS
Clean wound with soap and water daily apply antibiotic ointment twice a day with Band-Aid or leave open to air.  No need for Ace wrap or large dressing

## 2023-05-09 NOTE — ED PROVIDER NOTES
"Encounter Date: 5/9/2023       History     Chief Complaint   Patient presents with    Insect Bite     "I was bit by a spider couple weeks ago, I can't move my thumb, I tried soaking it,"     27-year-old female presents emergency room with "spider bite a couple weeks ago" to right thumb.  Patient reports soaking wound without any improvement.  Patient reports that she also has been squeezing wound.  Patient is currently pregnant.      Review of patient's allergies indicates:   Allergen Reactions    Amoxicillin Swelling    Naproxen Nausea And Vomiting    Neurontin [gabapentin] Nausea And Vomiting    Toradol [ketorolac] Nausea And Vomiting    Tramadol Nausea And Vomiting     Past Medical History:   Diagnosis Date    Anxiety     Depression      Past Surgical History:   Procedure Laterality Date    CHOLECYSTECTOMY       Family History   Problem Relation Age of Onset    Alcohol abuse Mother     COPD Mother     Arthritis Father      Social History     Tobacco Use    Smoking status: Former    Smokeless tobacco: Never   Substance Use Topics    Alcohol use: Not Currently     Alcohol/week: 0.0 standard drinks    Drug use: No     Review of Systems   Constitutional:  Negative for fever.   HENT:  Negative for sore throat.    Respiratory:  Negative for shortness of breath.    Cardiovascular:  Negative for chest pain.   Gastrointestinal:  Negative for nausea.   Genitourinary:  Negative for dysuria.   Musculoskeletal:  Negative for back pain.   Skin:  Positive for wound. Negative for rash.   Neurological:  Negative for weakness.   Hematological:  Does not bruise/bleed easily.   All other systems reviewed and are negative.    Physical Exam     Initial Vitals [05/09/23 1252]   BP Pulse Resp Temp SpO2   (!) 141/79 83 16 98.6 °F (37 °C) 99 %      MAP       --         Physical Exam    Nursing note and vitals reviewed.  Constitutional: She appears well-developed and well-nourished.   HENT:   Head: Normocephalic and atraumatic.   Eyes: " Pupils are equal, round, and reactive to light.   Neck:   Normal range of motion.  Musculoskeletal:         General: Normal range of motion.      Cervical back: Normal range of motion.     Neurological: She is alert and oriented to person, place, and time.   Skin: Skin is warm and dry.   Very shallow wound noted to right thumb the size of a dime.  No active drainage noted.  No necrotic tissue.  No redness or swelling.   Psychiatric: She has a normal mood and affect.       ED Course   Procedures  Labs Reviewed - No data to display       Imaging Results    None          Medications   mupirocin 2 % ointment 22 g (has no administration in time range)     Medical Decision Making:   Differential Diagnosis:   Open wound, insect bite, abrasion                        Clinical Impression:   Final diagnoses:  [Z51.89] Visit for wound check (Primary)        ED Disposition Condition    Discharge Stable          ED Prescriptions    None       Follow-up Information       Follow up With Specialties Details Why Contact Info    [facility] Psychology, Internal Medicine, Gynecology, Dental General Practice  As needed [address] [facility] [address]  [phone]               Brandee Cueto NP  05/09/23 3553

## 2023-07-10 ENCOUNTER — HOSPITAL ENCOUNTER (EMERGENCY)
Facility: OTHER | Age: 28
Discharge: HOME | End: 2023-07-10
Attending: EMERGENCY MEDICINE | Admitting: OBSTETRICS & GYNECOLOGY
Payer: MEDICAID

## 2023-07-10 VITALS
TEMPERATURE: 98 F | DIASTOLIC BLOOD PRESSURE: 60 MMHG | OXYGEN SATURATION: 97 % | SYSTOLIC BLOOD PRESSURE: 106 MMHG | HEART RATE: 76 BPM | WEIGHT: 156 LBS | HEIGHT: 60 IN | RESPIRATION RATE: 18 BRPM | BODY MASS INDEX: 30.63 KG/M2

## 2023-07-10 DIAGNOSIS — E86.0 DEHYDRATION: ICD-10-CM

## 2023-07-10 DIAGNOSIS — O47.9 IRREGULAR UTERINE CONTRACTIONS: ICD-10-CM

## 2023-07-10 DIAGNOSIS — R10.30 LOWER ABDOMINAL PAIN: ICD-10-CM

## 2023-07-10 DIAGNOSIS — Z3A.29 29 WEEKS GESTATION OF PREGNANCY: Primary | ICD-10-CM

## 2023-07-10 LAB
ABO + RH BLD: NORMAL
ALBUMIN SERPL BCP-MCNC: 3 G/DL (ref 3.5–5.2)
ALP SERPL-CCNC: 105 U/L (ref 55–135)
ALT SERPL W/O P-5'-P-CCNC: 8 U/L (ref 10–44)
ANION GAP SERPL CALC-SCNC: 11 MMOL/L (ref 8–16)
AST SERPL-CCNC: 14 U/L (ref 10–40)
BACTERIA #/AREA URNS HPF: ABNORMAL /HPF
BASOPHILS # BLD AUTO: 0.03 K/UL (ref 0–0.2)
BASOPHILS # BLD AUTO: 0.05 K/UL (ref 0–0.2)
BASOPHILS NFR BLD: 0.3 % (ref 0–1.9)
BASOPHILS NFR BLD: 0.4 % (ref 0–1.9)
BILIRUB SERPL-MCNC: 0.6 MG/DL (ref 0.1–1)
BILIRUB SERPL-MCNC: NORMAL MG/DL
BILIRUB UR QL STRIP: NEGATIVE
BLD GP AB SCN CELLS X3 SERPL QL: NORMAL
BLOOD URINE, POC: NORMAL
BUN SERPL-MCNC: 5 MG/DL (ref 6–20)
CALCIUM SERPL-MCNC: 9.3 MG/DL (ref 8.7–10.5)
CHLORIDE SERPL-SCNC: 104 MMOL/L (ref 95–110)
CLARITY UR: CLEAR
CO2 SERPL-SCNC: 20 MMOL/L (ref 23–29)
COLOR UR: YELLOW
COLOR, POC UA: NORMAL
CREAT SERPL-MCNC: 0.5 MG/DL (ref 0.5–1.4)
DIFFERENTIAL METHOD: ABNORMAL
DIFFERENTIAL METHOD: ABNORMAL
EOSINOPHIL # BLD AUTO: 0 K/UL (ref 0–0.5)
EOSINOPHIL # BLD AUTO: 0 K/UL (ref 0–0.5)
EOSINOPHIL NFR BLD: 0 % (ref 0–8)
EOSINOPHIL NFR BLD: 0.1 % (ref 0–8)
ERYTHROCYTE [DISTWIDTH] IN BLOOD BY AUTOMATED COUNT: 13.3 % (ref 11.5–14.5)
ERYTHROCYTE [DISTWIDTH] IN BLOOD BY AUTOMATED COUNT: 13.3 % (ref 11.5–14.5)
EST. GFR  (NO RACE VARIABLE): >60 ML/MIN/1.73 M^2
GLUCOSE SERPL-MCNC: 99 MG/DL (ref 70–110)
GLUCOSE UR QL STRIP: NEGATIVE
GLUCOSE UR QL STRIP: NORMAL
HCT VFR BLD AUTO: 33.2 % (ref 37–48.5)
HCT VFR BLD AUTO: 37.5 % (ref 37–48.5)
HCV AB SERPL QL IA: NORMAL
HGB BLD-MCNC: 10.9 G/DL (ref 12–16)
HGB BLD-MCNC: 12.1 G/DL (ref 12–16)
HGB UR QL STRIP: ABNORMAL
HIV 1+2 AB+HIV1 P24 AG SERPL QL IA: NORMAL
HYALINE CASTS #/AREA URNS LPF: 0 /LPF
IMM GRANULOCYTES # BLD AUTO: 0.12 K/UL (ref 0–0.04)
IMM GRANULOCYTES # BLD AUTO: 0.14 K/UL (ref 0–0.04)
IMM GRANULOCYTES NFR BLD AUTO: 1.1 % (ref 0–0.5)
IMM GRANULOCYTES NFR BLD AUTO: 1.1 % (ref 0–0.5)
KETONES UR QL STRIP: ABNORMAL
KETONES UR QL STRIP: NORMAL
LEUKOCYTE ESTERASE UR QL STRIP: NEGATIVE
LEUKOCYTE ESTERASE URINE, POC: NORMAL
LIPASE SERPL-CCNC: 9 U/L (ref 4–60)
LYMPHOCYTES # BLD AUTO: 0.5 K/UL (ref 1–4.8)
LYMPHOCYTES # BLD AUTO: 0.6 K/UL (ref 1–4.8)
LYMPHOCYTES NFR BLD: 4.2 % (ref 18–48)
LYMPHOCYTES NFR BLD: 5.6 % (ref 18–48)
MAGNESIUM SERPL-MCNC: 1.5 MG/DL (ref 1.6–2.6)
MCH RBC QN AUTO: 28.2 PG (ref 27–31)
MCH RBC QN AUTO: 28.4 PG (ref 27–31)
MCHC RBC AUTO-ENTMCNC: 32.3 G/DL (ref 32–36)
MCHC RBC AUTO-ENTMCNC: 32.8 G/DL (ref 32–36)
MCV RBC AUTO: 86 FL (ref 82–98)
MCV RBC AUTO: 88 FL (ref 82–98)
MICROSCOPIC COMMENT: ABNORMAL
MONOCYTES # BLD AUTO: 0.8 K/UL (ref 0.3–1)
MONOCYTES # BLD AUTO: 1 K/UL (ref 0.3–1)
MONOCYTES NFR BLD: 7.5 % (ref 4–15)
MONOCYTES NFR BLD: 7.7 % (ref 4–15)
NEUTROPHILS # BLD AUTO: 11 K/UL (ref 1.8–7.7)
NEUTROPHILS # BLD AUTO: 9.4 K/UL (ref 1.8–7.7)
NEUTROPHILS NFR BLD: 85.5 % (ref 38–73)
NEUTROPHILS NFR BLD: 86.5 % (ref 38–73)
NITRITE UR QL STRIP: NEGATIVE
NITRITE, POC UA: NORMAL
NRBC BLD-RTO: 0 /100 WBC
NRBC BLD-RTO: 0 /100 WBC
PH UR STRIP: 6 [PH] (ref 5–8)
PH, POC UA: 6
PHOSPHATE SERPL-MCNC: 2.6 MG/DL (ref 2.7–4.5)
PLATELET # BLD AUTO: 209 K/UL (ref 150–450)
PLATELET # BLD AUTO: 220 K/UL (ref 150–450)
PLATELET BLD QL SMEAR: ABNORMAL
PMV BLD AUTO: 10.7 FL (ref 9.2–12.9)
PMV BLD AUTO: 10.9 FL (ref 9.2–12.9)
POTASSIUM SERPL-SCNC: 3.9 MMOL/L (ref 3.5–5.1)
PROT SERPL-MCNC: 7 G/DL (ref 6–8.4)
PROT UR QL STRIP: ABNORMAL
PROTEIN, POC: NORMAL
RBC # BLD AUTO: 3.87 M/UL (ref 4–5.4)
RBC # BLD AUTO: 4.26 M/UL (ref 4–5.4)
RBC #/AREA URNS HPF: 2 /HPF (ref 0–4)
SODIUM SERPL-SCNC: 135 MMOL/L (ref 136–145)
SP GR UR STRIP: >1.03 (ref 1–1.03)
SPECIFIC GRAVITY, POC UA: 1.02
SPECIMEN OUTDATE: NORMAL
SQUAMOUS #/AREA URNS HPF: 1 /HPF
URN SPEC COLLECT METH UR: ABNORMAL
UROBILINOGEN UR STRIP-ACNC: NEGATIVE EU/DL
UROBILINOGEN, POC UA: NORMAL
WBC # BLD AUTO: 11.02 K/UL (ref 3.9–12.7)
WBC # BLD AUTO: 12.65 K/UL (ref 3.9–12.7)
WBC #/AREA URNS HPF: 1 /HPF (ref 0–5)

## 2023-07-10 PROCEDURE — 99285 EMERGENCY DEPT VISIT HI MDM: CPT

## 2023-07-10 PROCEDURE — 86803 HEPATITIS C AB TEST: CPT | Performed by: PHYSICIAN ASSISTANT

## 2023-07-10 PROCEDURE — 83735 ASSAY OF MAGNESIUM: CPT

## 2023-07-10 PROCEDURE — 84100 ASSAY OF PHOSPHORUS: CPT

## 2023-07-10 PROCEDURE — 85025 COMPLETE CBC W/AUTO DIFF WBC: CPT | Mod: 91

## 2023-07-10 PROCEDURE — 85025 COMPLETE CBC W/AUTO DIFF WBC: CPT | Performed by: EMERGENCY MEDICINE

## 2023-07-10 PROCEDURE — 25000003 PHARM REV CODE 250

## 2023-07-10 PROCEDURE — 83690 ASSAY OF LIPASE: CPT | Performed by: EMERGENCY MEDICINE

## 2023-07-10 PROCEDURE — 76815 OB US LIMITED FETUS(S): CPT | Mod: 26,,, | Performed by: OBSTETRICS & GYNECOLOGY

## 2023-07-10 PROCEDURE — 96365 THER/PROPH/DIAG IV INF INIT: CPT

## 2023-07-10 PROCEDURE — 87481 CANDIDA DNA AMP PROBE: CPT | Mod: 59

## 2023-07-10 PROCEDURE — 25000003 PHARM REV CODE 250: Performed by: EMERGENCY MEDICINE

## 2023-07-10 PROCEDURE — 59025 PR FETAL 2N-STRESS TEST: ICD-10-PCS | Mod: 26,,, | Performed by: OBSTETRICS & GYNECOLOGY

## 2023-07-10 PROCEDURE — 63600175 PHARM REV CODE 636 W HCPCS

## 2023-07-10 PROCEDURE — 99284 PR EMERGENCY DEPT VISIT,LEVEL IV: ICD-10-PCS | Mod: 25,,, | Performed by: OBSTETRICS & GYNECOLOGY

## 2023-07-10 PROCEDURE — 76815 PR  US,PREGNANT UTERUS,LIMITED, 1/> FETUSES: ICD-10-PCS | Mod: 26,,, | Performed by: OBSTETRICS & GYNECOLOGY

## 2023-07-10 PROCEDURE — 86900 BLOOD TYPING SEROLOGIC ABO: CPT | Performed by: EMERGENCY MEDICINE

## 2023-07-10 PROCEDURE — 59025 FETAL NON-STRESS TEST: CPT | Mod: 26,,, | Performed by: OBSTETRICS & GYNECOLOGY

## 2023-07-10 PROCEDURE — 63600175 PHARM REV CODE 636 W HCPCS: Performed by: OBSTETRICS & GYNECOLOGY

## 2023-07-10 PROCEDURE — 25000003 PHARM REV CODE 250: Performed by: STUDENT IN AN ORGANIZED HEALTH CARE EDUCATION/TRAINING PROGRAM

## 2023-07-10 PROCEDURE — 87591 N.GONORRHOEAE DNA AMP PROB: CPT | Mod: 59

## 2023-07-10 PROCEDURE — 80053 COMPREHEN METABOLIC PANEL: CPT | Performed by: EMERGENCY MEDICINE

## 2023-07-10 PROCEDURE — 96367 TX/PROPH/DG ADDL SEQ IV INF: CPT

## 2023-07-10 PROCEDURE — 81000 URINALYSIS NONAUTO W/SCOPE: CPT | Performed by: EMERGENCY MEDICINE

## 2023-07-10 PROCEDURE — 96366 THER/PROPH/DIAG IV INF ADDON: CPT

## 2023-07-10 PROCEDURE — 87389 HIV-1 AG W/HIV-1&-2 AB AG IA: CPT | Performed by: PHYSICIAN ASSISTANT

## 2023-07-10 PROCEDURE — 99284 EMERGENCY DEPT VISIT MOD MDM: CPT | Mod: 25,,, | Performed by: OBSTETRICS & GYNECOLOGY

## 2023-07-10 RX ORDER — ACETAMINOPHEN 500 MG
1000 TABLET ORAL ONCE
Status: COMPLETED | OUTPATIENT
Start: 2023-07-10 | End: 2023-07-10

## 2023-07-10 RX ORDER — SODIUM,POTASSIUM PHOSPHATES 280-250MG
1 POWDER IN PACKET (EA) ORAL ONCE
Status: COMPLETED | OUTPATIENT
Start: 2023-07-10 | End: 2023-07-10

## 2023-07-10 RX ORDER — DIPHENHYDRAMINE HCL 25 MG
25 CAPSULE ORAL ONCE
Status: COMPLETED | OUTPATIENT
Start: 2023-07-10 | End: 2023-07-10

## 2023-07-10 RX ORDER — ONDANSETRON 4 MG/1
4 TABLET, ORALLY DISINTEGRATING ORAL
Status: COMPLETED | OUTPATIENT
Start: 2023-07-10 | End: 2023-07-10

## 2023-07-10 RX ORDER — LANOLIN ALCOHOL/MO/W.PET/CERES
400 CREAM (GRAM) TOPICAL ONCE
Status: COMPLETED | OUTPATIENT
Start: 2023-07-10 | End: 2023-07-10

## 2023-07-10 RX ORDER — ACETAMINOPHEN 500 MG
1000 TABLET ORAL EVERY 6 HOURS PRN
Status: DISCONTINUED | OUTPATIENT
Start: 2023-07-10 | End: 2023-07-10 | Stop reason: HOSPADM

## 2023-07-10 RX ADMIN — SODIUM CHLORIDE, SODIUM LACTATE, POTASSIUM CHLORIDE, CALCIUM CHLORIDE AND DEXTROSE MONOHYDRATE 1000 ML: 5; 600; 310; 30; 20 INJECTION, SOLUTION INTRAVENOUS at 01:07

## 2023-07-10 RX ADMIN — SODIUM CHLORIDE, POTASSIUM CHLORIDE, SODIUM LACTATE AND CALCIUM CHLORIDE 500 ML: 600; 310; 30; 20 INJECTION, SOLUTION INTRAVENOUS at 12:07

## 2023-07-10 RX ADMIN — ACETAMINOPHEN 1000 MG: 500 TABLET ORAL at 12:07

## 2023-07-10 RX ADMIN — POTASSIUM & SODIUM PHOSPHATES POWDER PACK 280-160-250 MG 1 PACKET: 280-160-250 PACK at 03:07

## 2023-07-10 RX ADMIN — ONDANSETRON 4 MG: 4 TABLET, ORALLY DISINTEGRATING ORAL at 10:07

## 2023-07-10 RX ADMIN — PROMETHAZINE HYDROCHLORIDE 25 MG: 25 INJECTION INTRAMUSCULAR; INTRAVENOUS at 12:07

## 2023-07-10 RX ADMIN — Medication 400 MG: at 03:07

## 2023-07-10 RX ADMIN — DIPHENHYDRAMINE HYDROCHLORIDE 25 MG: 25 CAPSULE ORAL at 10:07

## 2023-07-10 NOTE — ED NOTES
Shannan Cline, a 27 y.o. female presents to the ED w/ complaint of water breaking and contractions constant.  Patient reports 29 weeks gestation with previous births permature .      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    LOC: Patient name and date of birth verified. The patient is awake, alert and aware of environment with an appropriate affect, the patient is oriented x 3 and speaking appropriately.   APPEARANCE: Patient resting comfortably, patient is clean and well groomed, patient's clothing is properly fastened.  SKIN: The skin is warm and dry, color consistent with ethnicity, patient has normal skin turgor and moist mucus membranes, skin intact, no breakdown or bruising noted.  MUSCULOSKELETAL: Patient moving all extremities well, no obvious swelling or deformities noted.   RESPIRATORY: Respirations are spontaneous, patient has a normal effort and rate, no accessory muscle use noted.  CARDIAC: Patient has a normal rate and rhythm, no periphreal edema noted, capillary refill < 3 seconds.  ABDOMEN: Soft and non tender to palpation. Reports constant contraction like pain  NEUROLOGIC: Eyes open spontaneously, behavior appropriate to situation, follows commands, facial expression symmetrical, bilateral hand grasp equal and even, purposeful motor response noted, normal sensation in all extremities when touched with a finger.

## 2023-07-10 NOTE — ED PROVIDER NOTES
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 F at 29w4d presents complaining of vaginal discharge and contractions. She was transferred from [facility], after she was instructed to present to the ED by her child's physician. This morning, patient reports that her pants were completely soaked. She has had nausea and vomiting for the past few days, but the abdominal pain/contractions started today.  This IUP is complicated by h/o PTL.  Patient reports contractions, denies vaginal bleeding, reports LOF.   Fetal Movement: normal.       Review of patient's allergies indicates:  No Known Allergies  No past medical history on file.  No past surgical history on file.  No family history on file.     Review of Systems   Constitutional:  Negative for fever.   HENT:  Negative for sore throat.    Respiratory:  Negative for shortness of breath.    Cardiovascular:  Negative for chest pain.   Gastrointestinal:  Positive for abdominal pain and nausea.   Genitourinary:  Positive for vaginal pain. Negative for dysuria, vaginal bleeding and vaginal discharge.   Musculoskeletal:  Negative for back pain.   Skin:  Negative for rash.   Neurological:  Negative for weakness.   Hematological:  Does not bruise/bleed easily.     Physical Exam     Initial Vitals [07/10/23 0946]   BP Pulse Resp Temp SpO2   120/65 97 18 98 °F (36.7 °C) 97 %      MAP       --         Physical Exam    Vitals reviewed.  Constitutional: She appears well-developed and well-nourished. She is not diaphoretic. No distress.   HENT:   Head: Normocephalic and atraumatic.   Eyes: Conjunctivae and EOM are normal.   Neck: Neck supple.   Normal range of motion.  Cardiovascular:  Normal rate.           Pulmonary/Chest:   Normal work of breathing   Abdominal: Abdomen is soft. There is no  abdominal tenderness.   Gravid uterus There is no rebound and no guarding.   Musculoskeletal:         General: Normal range of motion.      Cervical back: Normal range of motion and neck supple.     Neurological: She is alert and oriented to person, place, and time.   Skin: Skin is warm and dry.   Psychiatric: She has a normal mood and affect. Thought content normal.     OB LABOR EXAM:             Dilatation: 0.   Station: -5.   Effacement: 40%.       Comments: - valsalva, - pooling, - nitrazine  Mucus present, GC/CT and vaginosis collected     ED Course   Fetal non-stress test    Date/Time: 7/10/2023 5:08 PM  Performed by: Shelby Gaming MD  Authorized by: Evie Michael MD     Nonstress Test:     Variability:  6-25 BPM    Decelerations:  None    Accelerations:  10 bpm    Acoustic Stimulator: No      Baseline:  125    Uterine Irritability: No      Contractions:  Regular (initially q5-8 min, then resolved)      Labs Reviewed   CBC W/ AUTO DIFFERENTIAL - Abnormal; Notable for the following components:       Result Value    Immature Granulocytes 1.1 (*)     Gran # (ANC) 11.0 (*)     Immature Grans (Abs) 0.14 (*)     Lymph # 0.5 (*)     Gran % 86.5 (*)     Lymph % 4.2 (*)     All other components within normal limits   COMPREHENSIVE METABOLIC PANEL - Abnormal; Notable for the following components:    Sodium 135 (*)     CO2 20 (*)     BUN 5 (*)     Albumin 3.0 (*)     ALT 8 (*)     All other components within normal limits   URINALYSIS, REFLEX TO URINE CULTURE - Abnormal; Notable for the following components:    Specific Gravity, UA >1.030 (*)     Protein, UA 1+ (*)     Ketones, UA 3+ (*)     Occult Blood UA 1+ (*)     All other components within normal limits    Narrative:     Specimen Source->Urine   CBC W/ AUTO DIFFERENTIAL - Abnormal; Notable for the following components:    RBC 3.87 (*)     Hemoglobin 10.9 (*)     Hematocrit 33.2 (*)     Immature Granulocytes 1.1 (*)     Gran # (ANC) 9.4 (*)     Immature  Grans (Abs) 0.12 (*)     Lymph # 0.6 (*)     Gran % 85.5 (*)     Lymph % 5.6 (*)     All other components within normal limits   MAGNESIUM - Abnormal; Notable for the following components:    Magnesium 1.5 (*)     All other components within normal limits   PHOSPHORUS - Abnormal; Notable for the following components:    Phosphorus 2.6 (*)     All other components within normal limits   URINALYSIS MICROSCOPIC - Abnormal; Notable for the following components:    Bacteria Few (*)     All other components within normal limits    Narrative:     Specimen Source->Urine   VAGINOSIS SCREEN BY DNA PROBE   C. TRACHOMATIS/N. GONORRHOEAE BY AMP DNA   HIV 1 / 2 ANTIBODY    Narrative:     Release to patient->Immediate   HEPATITIS C ANTIBODY    Narrative:     Release to patient->Immediate   LIPASE   URINALYSIS, REFLEX TO URINE CULTURE   POCT URINALYSIS, DIPSTICK OR TABLET REAGENT, AUTOMATED, WITH MICROSCOP   TYPE & SCREEN          Imaging Results    None          Medications   acetaminophen tablet 1,000 mg (has no administration in time range)   diphenhydrAMINE capsule 25 mg (25 mg Oral Given 7/10/23 1029)   ondansetron disintegrating tablet 4 mg (4 mg Oral Given 7/10/23 1029)   lactated ringers bolus 500 mL (0 mLs Intravenous Stopped 7/10/23 1335)   acetaminophen tablet 1,000 mg (1,000 mg Oral Given 7/10/23 1214)   promethazine (PHENERGAN) 25 mg in dextrose 5 % (D5W) 50 mL IVPB (0 mg Intravenous Stopped 7/10/23 1303)   dextrose 5% lactated ringers bolus 1,000 mL (0 mLs Intravenous Stopped 7/10/23 1515)   magnesium oxide tablet 400 mg (400 mg Oral Given 7/10/23 1529)   potassium, sodium phosphates 280-160-250 mg packet 1 packet (1 packet Oral Given 7/10/23 1537)     Medical Decision Making:   ED Management:  - VSS  - FHT RR  - [place] q 5-8 mins, then spaced out and completely resolved  - SVE closed, unchanged on 2 hour recheck  - SSE with no evidence of ROM  - GC/CT and vaginosis collected  - MVP 6 cm, EFW 1275g, posterior  placenta  - Urine dip with ketones, given LR bolus with improvement in ctx and ketones  - Mag and Phos low, repleted appropriately  - nausea improved with zofran  - pain improved with tylenol  - Stable for discharge, given strict ED return precautions  - Patient has follow up with her primary OB provider tomorrow    Other:   I have discussed this case with another health care provider.          Attending Attestation:   Physician Attestation Statement for Resident:  As the supervising MD   Physician Attestation Statement: I have personally seen and examined this patient.   I agree with the above history.  -:   As the supervising MD I agree with the above PE.     As the supervising MD I agree with the above treatment, course, plan, and disposition.   I was personally present during the critical portions of the procedure(s) performed by the resident and was immediately available in the ED to provide services and assistance as needed during the entire procedure.                             Clinical Impression:   Final diagnoses:  [Z3A.29] 29 weeks gestation of pregnancy (Primary)  [R10.30] Lower abdominal pain  [O47.9] Irregular uterine contractions  [E86.0] Dehydration        ED Disposition Condition    Discharge Stable          ED Prescriptions    None       Follow-up Information    None       Shelby Gaming MD/MPH  OB/GYN PGY4       Evie Michael MD  07/15/23 9205

## 2023-07-10 NOTE — LETTER
"     [facility] Emergency Dept (Obstetrics)  [address]  [facility] [address]  Phone: [phone]   July 10, 2023    Patient: Shannan Cline (Rebecca)   YOB: 1995   Date of Visit: 7/10/2023   Patient ID [id number]       To Whom It May Concern:    Please excuse Mr. Adeel Cline from work today, as his wife was seen and treated in our obstetrics emergency department on 7/10/2023.     Thank you for your cooperation.  Please call with any questions or concerns.       Sincerely,          ,       "

## 2023-07-10 NOTE — ED PROVIDER NOTES
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  presents complaining of vaginal discharge and contractions.  Patient reports that she felt her unaware with this morning when she went to the bathroom.  Also feel a constant pain to her lower abdomen.  Patient has history of  labor, her last child was delivered at home.   This IUP is complicated by none.  Patient reports contractions, denies vaginal bleeding, reports LOF.   Fetal Movement: normal       Review of patient's allergies indicates:  No Known Allergies  No past medical history on file.  No past surgical history on file.  No family history on file.     Review of Systems   Constitutional:  Negative for chills and fever.   HENT:  Negative for congestion.    Eyes:  Negative for visual disturbance.   Respiratory:  Negative for shortness of breath.    Cardiovascular:  Negative for chest pain and palpitations.   Gastrointestinal:  Positive for nausea and vomiting. Negative for constipation and diarrhea.   Genitourinary:  Positive for dysuria. Negative for vaginal bleeding and vaginal discharge.   Musculoskeletal:  Negative for back pain.   Skin:  Negative for rash.   Neurological:  Negative for light-headedness and headaches.     Physical Exam     Initial Vitals [07/10/23 0946]   BP Pulse Resp Temp SpO2   120/65 97 18 98 °F (36.7 °C) 97 %      MAP       --         Physical Exam    Nursing note and vitals reviewed.  Constitutional: She appears well-developed. No distress.   HENT:   Head: Normocephalic and atraumatic.   Mouth/Throat: Oropharynx is clear and moist.   Eyes: Conjunctivae and EOM are normal.   Neck: No JVD present.   Normal range of motion.  Cardiovascular:  Normal rate, regular rhythm, normal heart sounds and intact distal pulses.           Pulmonary/Chest:  Breath sounds normal. No respiratory distress.   Abdominal: Abdomen is soft. She exhibits no distension. There is no abdominal tenderness.   Genitourinary:    Genitourinary Comments: Sterile speculum exam without pooling of fluid.  There is white mucus like discharge in vaginal canal.  SVE close/long/high     Musculoskeletal:         General: No tenderness or edema. Normal range of motion.      Cervical back: Normal range of motion.     Neurological: She is alert and oriented to person, place, and time. She has normal strength.   Skin: Skin is warm and dry. Capillary refill takes less than 2 seconds.       ED Course   Procedures  Labs Reviewed   CBC W/ AUTO DIFFERENTIAL - Abnormal; Notable for the following components:       Result Value    Immature Granulocytes 1.1 (*)     Gran # (ANC) 11.0 (*)     Immature Grans (Abs) 0.14 (*)     Lymph # 0.5 (*)     Gran % 86.5 (*)     Lymph % 4.2 (*)     All other components within normal limits   COMPREHENSIVE METABOLIC PANEL - Abnormal; Notable for the following components:    Sodium 135 (*)     CO2 20 (*)     BUN 5 (*)     Albumin 3.0 (*)     ALT 8 (*)     All other components within normal limits   URINALYSIS, REFLEX TO URINE CULTURE - Abnormal; Notable for the following components:    Specific Gravity, UA >1.030 (*)     Protein, UA 1+ (*)     Ketones, UA 3+ (*)     Occult Blood UA 1+ (*)     All other components within normal limits    Narrative:     Specimen Source->Urine   CBC W/ AUTO DIFFERENTIAL - Abnormal; Notable for the following components:    RBC 3.87 (*)     Hemoglobin 10.9 (*)     Hematocrit 33.2 (*)     Immature Granulocytes 1.1 (*)     Gran # (ANC) 9.4 (*)     Immature Grans (Abs) 0.12 (*)     Lymph # 0.6 (*)     Gran % 85.5 (*)     Lymph % 5.6 (*)     All other components within normal limits   MAGNESIUM - Abnormal; Notable for the following components:    Magnesium 1.5 (*)     All other components within normal limits   PHOSPHORUS - Abnormal; Notable for the  following components:    Phosphorus 2.6 (*)     All other components within normal limits   URINALYSIS MICROSCOPIC - Abnormal; Notable for the following components:    Bacteria Few (*)     All other components within normal limits    Narrative:     Specimen Source->Urine   VAGINOSIS SCREEN BY DNA PROBE    Narrative:     Release to patient->Immediate   C. TRACHOMATIS/N. GONORRHOEAE BY AMP DNA    Narrative:     Sources by Resulting Lab:->[facility]  Release to patient->Immediate   HIV 1 / 2 ANTIBODY    Narrative:     Release to patient->Immediate   HEPATITIS C ANTIBODY    Narrative:     Release to patient->Immediate   LIPASE   POCT URINALYSIS, DIPSTICK OR TABLET REAGENT, AUTOMATED, WITH MICROSCOP   TYPE & SCREEN          Imaging Results    None          Medications   diphenhydrAMINE capsule 25 mg (25 mg Oral Given 7/10/23 1029)   ondansetron disintegrating tablet 4 mg (4 mg Oral Given 7/10/23 1029)   lactated ringers bolus 500 mL (0 mLs Intravenous Stopped 7/10/23 1335)   acetaminophen tablet 1,000 mg (1,000 mg Oral Given 7/10/23 1214)   promethazine (PHENERGAN) 25 mg in dextrose 5 % (D5W) 50 mL IVPB (0 mg Intravenous Stopped 7/10/23 1303)   dextrose 5% lactated ringers bolus 1,000 mL (0 mLs Intravenous Stopped 7/10/23 1515)   magnesium oxide tablet 400 mg (400 mg Oral Given 7/10/23 1529)   potassium, sodium phosphates 280-160-250 mg packet 1 packet (1 packet Oral Given 7/10/23 1537)     Medical Decision Making:   History:   Old Medical Records: I decided to obtain old medical records.  Initial Assessment:   27 y.o. G5 29wks gestation presents complaining of vaginal discharge and contractions  Differential Diagnosis:    premature rupture,  labor, uti.   Independently Interpreted Test(s):   I have ordered and independently interpreted X-rays - see prior notes.  ED Management:  Rule out rupture  - SSE: without pooling, - valsalva  - US with MVP > 2,  good FM noted, vertex position confirmed  -   - no  s/s of ruptured membranes at this time  - will transfer to [facility] for NST          Attending Attestation:   Physician Attestation Statement for Resident:  As the supervising MD   Physician Attestation Statement: I have personally seen and examined this patient.   I agree with the above history.  -:   As the supervising MD I agree with the above PE.     As the supervising MD I agree with the above treatment, course, plan, and disposition.            Attending Critical Care:   Critical Care Times:   Direct Patient Care (initial evaluation, reassessments, and time considering the case)................................................................20 minutes.   Documentation..................................................................................................................................................................................5 minutes.   Consultation with other Physicians. .................................................................................................................................................5 minutes.   ==============================================================  Total Critical Care Time - exclusive of procedural time: 30 minutes.  ==============================================================  Critical care was necessary to treat or prevent imminent or life-threatening deterioration of the following conditions: vaginal bleeding.   Critical care was time spent personally by me on the following activities: obtaining history from patient or relative, examination of patient, development of treatment plan with patient or relative, ordering and performing treatments and interventions, evaluation of patient's response to treatment, re-evaluation of patient's conition and discussion with consultants.   Critical Care Condition: critical       Attending ED Notes:   STAFF ATTENDING PHYSICIAN NOTE:  I have individually/jointly evaluated Shannan Cline and discussed their  ED management with the resident physician. I have also reviewed their notes, assessments, and procedures documented.  I was present during all critical portions of any procedure(s) performed on Shannan Cline.   ____________________  Ashish Ponce MD, [facility]  Emergency Medicine Staff                   Clinical Impression:   Final diagnoses:  [Z3A.29] 29 weeks gestation of pregnancy (Primary)  [R10.30] Lower abdominal pain  [O47.9] Irregular uterine contractions  [E86.0] Dehydration        ED Disposition Condition    Discharge Stable          ED Prescriptions    None       Follow-up Information    None          Lazaro Howe MD  Resident  07/10/23 1544       Ernesto Ponce MD  07/12/23 1127

## 2023-07-10 NOTE — ED NOTES
Transfer center notified of patient needing to transferred to [facility] for laboring.

## 2023-07-11 LAB
C TRACH DNA SPEC QL NAA+PROBE: NOT DETECTED
CANDIDA RRNA VAG QL PROBE: NEGATIVE
N GONORRHOEA DNA SPEC QL NAA+PROBE: NOT DETECTED
T VAGINALIS RRNA GENITAL QL PROBE: NEGATIVE

## 2023-08-16 PROBLEM — O60.03 PRETERM LABOR IN THIRD TRIMESTER WITHOUT DELIVERY: Status: ACTIVE | Noted: 2023-08-16

## 2023-08-16 PROBLEM — O60.03 PRETERM LABOR IN THIRD TRIMESTER WITHOUT DELIVERY: Status: RESOLVED | Noted: 2023-08-16 | Resolved: 2023-08-16

## 2023-09-01 PROBLEM — Z34.90 TERM PREGNANCY: Status: ACTIVE | Noted: 2023-09-01

## 2024-01-03 ENCOUNTER — HOSPITAL ENCOUNTER (EMERGENCY)
Facility: HOSPITAL | Age: 29
Discharge: HOME | End: 2024-01-03
Attending: EMERGENCY MEDICINE
Payer: MEDICAID

## 2024-01-03 VITALS
SYSTOLIC BLOOD PRESSURE: 106 MMHG | HEART RATE: 77 BPM | OXYGEN SATURATION: 98 % | BODY MASS INDEX: 24.94 KG/M2 | WEIGHT: 127 LBS | TEMPERATURE: 98 F | DIASTOLIC BLOOD PRESSURE: 59 MMHG | HEIGHT: 60 IN | RESPIRATION RATE: 18 BRPM

## 2024-01-03 DIAGNOSIS — S01.01XA LACERATION OF SCALP, INITIAL ENCOUNTER: Primary | ICD-10-CM

## 2024-01-03 DIAGNOSIS — S09.90XA INJURY OF HEAD, INITIAL ENCOUNTER: ICD-10-CM

## 2024-01-03 PROCEDURE — 12001 RPR S/N/AX/GEN/TRNK 2.5CM/<: CPT

## 2024-01-03 PROCEDURE — 99284 EMERGENCY DEPT VISIT MOD MDM: CPT | Mod: 25

## 2024-01-03 PROCEDURE — 25000003 PHARM REV CODE 250: Performed by: EMERGENCY MEDICINE

## 2024-01-03 RX ORDER — ACETAMINOPHEN 500 MG
1000 TABLET ORAL EVERY 8 HOURS PRN
Qty: 30 TABLET | Refills: 0 | Status: SHIPPED | OUTPATIENT
Start: 2024-01-03

## 2024-01-03 RX ORDER — ONDANSETRON 4 MG/1
4 TABLET, ORALLY DISINTEGRATING ORAL EVERY 8 HOURS PRN
Qty: 8 TABLET | Refills: 0 | Status: SHIPPED | OUTPATIENT
Start: 2024-01-03

## 2024-01-03 RX ORDER — LIDOCAINE AND PRILOCAINE 25; 25 MG/G; MG/G
CREAM TOPICAL
Status: COMPLETED | OUTPATIENT
Start: 2024-01-03 | End: 2024-01-03

## 2024-01-03 RX ADMIN — LIDOCAINE AND PRILOCAINE: 25; 25 CREAM TOPICAL at 04:01

## 2024-01-03 NOTE — ED PROVIDER NOTES
EMERGENCY DEPARTMENT HISTORY AND PHYSICAL EXAM     This note is dictated on M*Modal word recognition program.  There are word recognition mistakes and grammatical errors that are occasionally missed on review.     Date: 1/3/2024   Patient Name: Shannan Cline       History of Presenting Illness      Chief Complaint   Patient presents with    Head Injury     Pt reports attempting to get into bed and falling - striking her head onto a tv. No LOC. Reports feeling dizzy.         0435   Shannan Cline is a 28 y.o. female with PMHX of anxiety, depression who presents to the emergency department C/O head injury.     Patient was attempting to get into bed when she lost balance and fell backwards hitting head on TV stand.  No LOC.  Reports head pain and feeling dizzy.      PCP: Ryan Obrien MD (Inactive)        No current facility-administered medications for this encounter.     Current Outpatient Medications   Medication Sig Dispense Refill    cyclobenzaprine HCl (FLEXERIL ORAL) Take 1 tablet by mouth 2 (two) times a day.      famotidine (PEPCID) 20 MG tablet Take 1 tablet (20 mg total) by mouth 2 (two) times daily. for 10 days 20 tablet 0    ferrous sulfate (IRON ORAL) Take 1 tablet by mouth once daily.      HYDROcodone-acetaminophen (NORCO) 5-325 mg per tablet Take 2 tablets by mouth every 6 (six) hours as needed for Pain. 28 tablet 0    ibuprofen (ADVIL,MOTRIN) 800 MG tablet Take 1 tablet (800 mg total) by mouth 3 (three) times daily. 40 tablet 0    metoclopramide HCl (REGLAN) 10 MG tablet Take 10 mg by mouth 2 (two) times a day.      NIFEdipine (PROCARDIA) 10 MG Cap Take 1 capsule (10 mg total) by mouth every 8 (eight) hours. 30 capsule 0    ondansetron (ZOFRAN-ODT) 4 MG TbDL Take 1 tablet (4 mg total) by mouth every 8 (eight) hours as needed (nausea/vomiting). 20 tablet 0    ondansetron (ZOFRAN-ODT) 4 MG TbDL Take 4 mg by mouth 3 (three) times daily as needed.      PNV no.153/FA/om3/dha/epa/fish (PRENATAL  GUMMIES ORAL) Take 1 tablet by mouth once daily.      prenatal 21-iron fu-folic acid (PRENATAL COMPLETE) 14 mg iron- 400 mcg Tab Take 1 tablet by mouth once daily. 30 tablet 0           Past History     Past Medical History:   Past Medical History:   Diagnosis Date    Acid reflux     Anxiety     Depression     Digestive disorder     during pregnancy, unable to digest food    Iron deficiency anemia of pregnancy     Pregnancy      (vaginal delivery x3)        Past Surgical History:   Past Surgical History:   Procedure Laterality Date    CHOLECYSTECTOMY      OVARIAN CYST REMOVAL      POSTPARTUM LIGATION OF FALLOPIAN TUBE Bilateral 2023    Procedure: LIGATION, FALLOPIAN TUBE, POSTPARTUM;  Surgeon: Cathy Ngo MD;  Location: [facility] OR;  Service: OB/GYN;  Laterality: Bilateral;        Family History:   Family History   Problem Relation Age of Onset    Alcohol abuse Mother     COPD Mother     Arthritis Father     Osteoporosis Mother     Alzheimer's disease Mother     Hypertension Father         Social History:   Social History     Tobacco Use    Smoking status: Former     Current packs/day: 0.00     Types: Cigarettes     Quit date:      Years since quittin.0    Smokeless tobacco: Never   Substance Use Topics    Alcohol use: Not Currently     Alcohol/week: 0.0 standard drinks of alcohol     Comment: rarely when not pregnant    Drug use: No        Allergies:   Review of patient's allergies indicates:   Allergen Reactions    Amoxicillin Swelling    Naproxen Nausea And Vomiting    Neurontin [gabapentin] Nausea And Vomiting    Toradol [ketorolac] Nausea And Vomiting    Tramadol Nausea And Vomiting          Review of Systems   Review of Systems   See HPI for pertinent positives and negatives       Physical Exam   There were no vitals filed for this visit.   Physical Exam  Vitals and nursing note reviewed.   Constitutional:       General: She is not in acute distress.     Appearance: Normal appearance.  She is not ill-appearing.   HENT:      Head: Normocephalic and atraumatic.        Comments: Partial-thickness laceration and hematoma to left occipital region, 2.5cm     Right Ear: External ear normal.      Left Ear: External ear normal.      Nose: Nose normal. No congestion or rhinorrhea.      Mouth/Throat:      Mouth: Mucous membranes are moist.   Eyes:      Conjunctiva/sclera: Conjunctivae normal.      Pupils: Pupils are equal, round, and reactive to light.   Pulmonary:      Effort: Pulmonary effort is normal. No respiratory distress.   Musculoskeletal:         General: No deformity. Normal range of motion.      Cervical back: Normal range of motion. No rigidity.   Skin:     General: Skin is dry.   Neurological:      General: No focal deficit present.      Mental Status: She is alert and oriented to person, place, and time. Mental status is at baseline.   Psychiatric:         Mood and Affect: Mood normal.         Behavior: Behavior normal.              Diagnostic Study Results      Labs -   No results found for this or any previous visit (from the past 12 hour(s)).     Radiologic Studies -    No orders to display        Medications given in the ED-   Medications - No data to display        Medical Decision Making    I am the first provider for this patient.     I reviewed the vital signs, available nursing notes, past medical history, past surgical history, family history and social history.     Vital Signs:  Reviewed the patient's vital signs.     Pulse Oximetry Analysis and Interpretation:    100% on Room Air, normal        External Test Results (Pertinent to encounter):    Records Reviewed: Nursing Notes and Old Medical Records    History Obtained By: Patient and Spouse    Provider Notes: Shannan Cline is a 28 y.o. female with fall, CHI    Co-morbidities Considered:     Differential Diagnosis:  Close head injury, laceration, concussion, skull fx      ED Course:    Patient presents with closed head injury.   No LOC.  Complains of headache and nausea.  CT head was obtained.  No acute injury per my read  Wound was cleaned.  Partial-thickness 2.5 cm laceration left occipital area.  Repaired with staples.  Discussed wound care instructions with patient and her .  Discussed concussion symptoms and management.  Advised follow-up patient's primary care doctor for re-evaluation.  Reasons to return to ED discussed         Problems Addressed:  Close head injury, scalp laceration    Procedures:   Lac Repair    Date/Time: 1/3/2024 5:35 AM    Performed by: Sanford Rojas MD  Authorized by: Sanford Rojas MD    Consent:     Consent obtained:  Verbal    Consent given by:  Patient    Risks discussed:  Pain    Alternatives discussed:  No treatment  Universal protocol:     Imaging studies available: yes    Anesthesia:     Anesthesia method:  Topical application    Topical anesthetic:  EMLA cream  Laceration details:     Location:  Scalp    Scalp location:  L parietal    Length (cm):  2.5    Depth (mm):  5  Pre-procedure details:     Preparation:  Imaging obtained to evaluate for foreign bodies  Exploration:     Wound extent: no fascia violation noted and no muscle damage noted      Contaminated: no    Treatment:     Area cleansed with:  Povidone-iodine and chlorhexidine    Amount of cleaning:  Standard    Irrigation solution:  Sterile saline  Skin repair:     Repair method:  Staples    Number of staples:  3  Approximation:     Approximation:  Close  Repair type:     Repair type:  Simple  Post-procedure details:     Dressing:  Antibiotic ointment    Procedure completion:  Tolerated well, no immediate complications         Diagnosis and Disposition     Critical Care:     DISCHARGE NOTE:       Shannan Cline's  results have been reviewed with her.  She has been counseled regarding her diagnosis, treatment, and plan.  She verbally conveys understanding and agreement of the signs, symptoms, diagnosis, treatment and  prognosis and additionally agrees to follow up as discussed.  She also agrees with the care-plan and conveys that all of her questions have been answered.  I have also provided discharge instructions for her that include: educational information regarding their diagnosis and treatment, and list of reasons why they would want to return to the ED prior to their follow-up appointment, should her condition change. She has been provided with education for proper emergency department utilization.         CLINICAL IMPRESSION:         1. Laceration of scalp, initial encounter    2. Injury of head, initial encounter              PLAN:   1. Discharge Home  2.      Medication List        ASK your doctor about these medications      famotidine 20 MG tablet  Commonly known as: PEPCID  Take 1 tablet (20 mg total) by mouth 2 (two) times daily. for 10 days     FLEXERIL ORAL     HYDROcodone-acetaminophen 5-325 mg per tablet  Commonly known as: NORCO  Take 2 tablets by mouth every 6 (six) hours as needed for Pain.     ibuprofen 800 MG tablet  Commonly known as: ADVIL,MOTRIN  Take 1 tablet (800 mg total) by mouth 3 (three) times daily.     IRON ORAL     metoclopramide HCl 10 MG tablet  Commonly known as: REGLAN     NIFEdipine 10 MG Cap  Commonly known as: PROCARDIA  Take 1 capsule (10 mg total) by mouth every 8 (eight) hours.     * ondansetron 4 MG Tbdl  Commonly known as: ZOFRAN-ODT  Take 1 tablet (4 mg total) by mouth every 8 (eight) hours as needed (nausea/vomiting).     * ondansetron 4 MG Tbdl  Commonly known as: ZOFRAN-ODT     PRENATAL COMPLETE 14 mg iron- 400 mcg Tab  Generic drug: prenatal 21-iron fu-folic acid  Take 1 tablet by mouth once daily.     PRENATAL GUMMIES ORAL           * This list has 2 medication(s) that are the same as other medications prescribed for you. Read the directions carefully, and ask your doctor or other care provider to review them with you.                 3. No follow-up provider specified.      _______________________________     Please note that this dictation was completed with M*Modal, the computer voice recognition software.  Quite often unanticipated grammatical, syntax, homophones, and other interpretive errors are inadvertently transcribed by the computer software.  Please disregard these errors.  Please excuse any errors that have escaped final proofreading.             Sanford Rojas MD  01/03/24 0557

## 2024-01-03 NOTE — Clinical Note
Shannan's  accompanied their spouse to the emergency department on 1/3/2024. They may return to work on 01/04/2024.      If you have any questions or concerns, please don't hesitate to call.      Sanford Rojas MD

## 2024-01-11 ENCOUNTER — HOSPITAL ENCOUNTER (EMERGENCY)
Facility: HOSPITAL | Age: 29
Discharge: HOME | End: 2024-01-11
Attending: EMERGENCY MEDICINE
Payer: MEDICAID

## 2024-01-11 VITALS
BODY MASS INDEX: 30.55 KG/M2 | DIASTOLIC BLOOD PRESSURE: 74 MMHG | HEART RATE: 70 BPM | SYSTOLIC BLOOD PRESSURE: 124 MMHG | OXYGEN SATURATION: 99 % | RESPIRATION RATE: 20 BRPM | WEIGHT: 155.63 LBS | HEIGHT: 60 IN | TEMPERATURE: 98 F

## 2024-01-11 DIAGNOSIS — Z48.02 ENCOUNTER FOR STAPLE REMOVAL: Primary | ICD-10-CM

## 2024-01-11 PROCEDURE — 99281 EMR DPT VST MAYX REQ PHY/QHP: CPT

## 2024-01-11 NOTE — ED PROVIDER NOTES
Encounter Date: 2024       History     Chief Complaint   Patient presents with    Suture / Staple Removal     Patient requesting staple removal. Patient reports pain and tenderness to one of the staples. Patient stated that she is continuing to feel dizziness and a headache.     This is a 28-year-old white female with a history anxiety who presents to the emergency department requesting removal of staples.  Patient had staples placed to scalp laceration here in the emergency department 8 days ago which was sustained from ground level fall with no loss of consciousness.  Patient reports that 1 of the staples has been very irritating and hurting more than the others.  She denies drainage from site, fever, or any other relative symptoms at this time.      Review of patient's allergies indicates:   Allergen Reactions    Amoxicillin Swelling    Naproxen Nausea And Vomiting    Neurontin [gabapentin] Nausea And Vomiting    Toradol [ketorolac] Nausea And Vomiting    Tramadol Nausea And Vomiting     Past Medical History:   Diagnosis Date    Acid reflux     Anxiety     Depression     Digestive disorder     during pregnancy, unable to digest food    Iron deficiency anemia of pregnancy     Pregnancy      (vaginal delivery x3)     Past Surgical History:   Procedure Laterality Date    CHOLECYSTECTOMY      OVARIAN CYST REMOVAL      POSTPARTUM LIGATION OF FALLOPIAN TUBE Bilateral 2023    Procedure: LIGATION, FALLOPIAN TUBE, POSTPARTUM;  Surgeon: Cathy Ngo MD;  Location: [facility] OR;  Service: OB/GYN;  Laterality: Bilateral;     Family History   Problem Relation Age of Onset    Alcohol abuse Mother     COPD Mother     Arthritis Father     Osteoporosis Mother     Alzheimer's disease Mother     Hypertension Father      Social History     Tobacco Use    Smoking status: Former     Current packs/day: 0.00     Types: Cigarettes     Quit date:      Years since quittin.0    Smokeless tobacco: Never   Substance  Use Topics    Alcohol use: Not Currently     Alcohol/week: 0.0 standard drinks of alcohol     Comment: rarely when not pregnant    Drug use: No     Review of Systems   Constitutional:  Negative for fever.   Gastrointestinal:  Negative for vomiting.   Skin:  Positive for wound.       Physical Exam     Initial Vitals [01/11/24 1242]   BP Pulse Resp Temp SpO2   124/74 70 20 98.4 °F (36.9 °C) 99 %      MAP       --         Physical Exam    Nursing note and vitals reviewed.  Constitutional: She appears well-developed and well-nourished. She is active. No distress.   HENT:   Head: Normocephalic and atraumatic.       Eyes: EOM are normal. Pupils are equal, round, and reactive to light.   Neck: Neck supple.   Normal range of motion.  Cardiovascular:  Normal rate.           Pulmonary/Chest: No respiratory distress.   Musculoskeletal:      Cervical back: Normal range of motion and neck supple.     Neurological: She is alert and oriented to person, place, and time. GCS score is 15. GCS eye subscore is 4. GCS verbal subscore is 5. GCS motor subscore is 6.   Skin: Skin is warm and dry. Capillary refill takes less than 2 seconds.   Psychiatric: She has a normal mood and affect. Her behavior is normal. Thought content normal.         ED Course   Suture Removal    Date/Time: 1/11/2024 1:12 PM  Location procedure was performed: [facility] EMERGENCY DEPARTMENT    Performed by: Beth Zavala NP  Authorized by: Morgan Perdomo MD  Body area: head/neck  Location details: scalp  Wound Appearance: clean, well healed, normal color and no drainage  Staples Removed: 3  Post-removal: no dressing applied  Facility: sutures placed in this facility  Estimated blood loss (mL): 0  Patient tolerance: Patient tolerated the procedure well with no immediate complications  Comments: One of the staples was noted to have rotated/imbedded vertically into the skin but was easily removed.        Labs Reviewed - No data to display       Imaging Results     None          Medications - No data to display  Medical Decision Making                                    Clinical Impression:  Final diagnoses:  [Z48.02] Encounter for staple removal (Primary)          ED Disposition Condition    Discharge Stable          ED Prescriptions    None       Follow-up Information       Follow up With Specialties Details Why Contact Info    PCP Follow UP  Schedule an appointment as soon as possible for a visit in 2 days for follow-up, for re-evaluation of today's complaint              Beth Zavala NP  01/11/24 1250

## 2024-01-11 NOTE — DISCHARGE INSTRUCTIONS
Continue to wash affected area twice daily with gentle soap and water.  You may take Tylenol and or Motrin as directed for pain.  Plan to follow-up with your primary doctor within the next week.  Return to the emergency department for worsening condition.

## 2024-02-08 DIAGNOSIS — M25.562 ACUTE PAIN OF LEFT KNEE: Primary | ICD-10-CM

## 2024-02-09 ENCOUNTER — HOSPITAL ENCOUNTER (OUTPATIENT)
Dept: RADIOLOGY | Facility: HOSPITAL | Age: 29
Discharge: HOME | End: 2024-02-09
Attending: NURSE PRACTITIONER
Payer: MEDICAID

## 2024-02-09 ENCOUNTER — OFFICE VISIT (OUTPATIENT)
Dept: ORTHOPEDICS | Facility: CLINIC | Age: 29
End: 2024-02-09
Payer: MEDICAID

## 2024-02-09 DIAGNOSIS — M25.362 INSTABILITY OF LEFT KNEE JOINT: ICD-10-CM

## 2024-02-09 DIAGNOSIS — M25.562 ACUTE PAIN OF LEFT KNEE: ICD-10-CM

## 2024-02-09 DIAGNOSIS — G89.29 CHRONIC PAIN OF LEFT KNEE: ICD-10-CM

## 2024-02-09 DIAGNOSIS — M25.562 CHRONIC PAIN OF LEFT KNEE: ICD-10-CM

## 2024-02-09 DIAGNOSIS — M25.562 ACUTE PAIN OF LEFT KNEE: Primary | ICD-10-CM

## 2024-02-09 PROCEDURE — 99999 PR PBB SHADOW E&M-EST. PATIENT-LVL II: CPT | Mod: PBBFAC,,, | Performed by: NURSE PRACTITIONER

## 2024-02-09 PROCEDURE — 99212 OFFICE O/P EST SF 10 MIN: CPT | Mod: PBBFAC,25 | Performed by: NURSE PRACTITIONER

## 2024-02-09 PROCEDURE — 99204 OFFICE O/P NEW MOD 45 MIN: CPT | Mod: S$PBB,,, | Performed by: NURSE PRACTITIONER

## 2024-02-09 PROCEDURE — 1160F RVW MEDS BY RX/DR IN RCRD: CPT | Mod: CPTII,,, | Performed by: NURSE PRACTITIONER

## 2024-02-09 PROCEDURE — 97110 THERAPEUTIC EXERCISES: CPT | Mod: GP,,, | Performed by: NURSE PRACTITIONER

## 2024-02-09 PROCEDURE — 1159F MED LIST DOCD IN RCRD: CPT | Mod: CPTII,,, | Performed by: NURSE PRACTITIONER

## 2024-02-09 PROCEDURE — 73562 X-RAY EXAM OF KNEE 3: CPT | Mod: TC,LT

## 2024-02-09 RX ORDER — IBUPROFEN 800 MG/1
800 TABLET ORAL EVERY 8 HOURS PRN
Qty: 20 TABLET | Refills: 0 | Status: SHIPPED | OUTPATIENT
Start: 2024-02-09 | End: 2024-04-02 | Stop reason: SDUPTHER

## 2024-02-09 NOTE — PROGRESS NOTES
Subjective:     Shannan Cline is a 28 y.o. female presents for evaluation and treatment of chronic left knee pain. She is referred by IGOR Riddle NP. This is evaluated as a non- injury. The pain began several years ago, reports pain started in her teens. She states that she has self treated for years and reports intermittent pain in the left knee for years. She presents and states that the pain's location is under the patella and over the joint lines, She describes the symptoms as sharp at times, presently rates as 5/10. Symptoms worsen with ROM, activity, getting up from a chair, weight bearing and sitting for prolonged periods of time. The knee has felt unstable and gives out at times. The patient can bend and straighten the knee fully but has pain. She is noted with a limp. She states that her lower leg feels cold at times.      Outside reports reviewed: PCP notes             Review of patient's allergies indicates:   Allergen Reactions    Amoxicillin Swelling    Naproxen Nausea And Vomiting    Neurontin [gabapentin] Nausea And Vomiting    Toradol [ketorolac] Nausea And Vomiting    Tramadol Nausea And Vomiting           Past Medical History:   Diagnosis Date    Acid reflux      Anxiety      Depression      Digestive disorder       during pregnancy, unable to digest food    Iron deficiency anemia of pregnancy      Pregnancy        (vaginal delivery x3)            Past Surgical History:   Procedure Laterality Date    CHOLECYSTECTOMY        OVARIAN CYST REMOVAL        POSTPARTUM LIGATION OF FALLOPIAN TUBE Bilateral 2023     Procedure: LIGATION, FALLOPIAN TUBE, POSTPARTUM;  Surgeon: Cathy Ngo MD;  Location: [facility];  Service: OB/GYN;  Laterality: Bilateral;            Family History   Problem Relation Age of Onset    Alcohol abuse Mother      COPD Mother      Arthritis Father      Osteoporosis Mother      Alzheimer's disease Mother      Hypertension Father        Social History             Tobacco Use    Smoking status: Former       Current packs/day: 0.00       Types: Cigarettes       Quit date:        Years since quittin.0    Smokeless tobacco: Never   Substance Use Topics    Alcohol use: Not Currently       Alcohol/week: 0.0 standard drinks of alcohol       Comment: rarely when not pregnant    Drug use: No      Review of Systems   Constitutional:  Negative for fever.   HENT:  Negative for sore throat.    Respiratory:  Negative for shortness of breath.    Cardiovascular:  Negative for chest pain.   Gastrointestinal:  Negative for nausea.   Genitourinary:  Negative for dysuria.   Musculoskeletal:  Positive for left knee pain  Skin:  Negative for rash.   Neurological:  Negative for weakness.   Hematological:  Does not bruise/bleed easily.   All other systems reviewed and are negative.        Objective:      NVI  General :   alert, appears stated age and cooperative   Gait: Antalgic gait   Left Lower Extremity  Hip Palpation:  no tenderness over the greater  trochanter   Hip ROM: 100% of normal    Knee Effusion:  trace   Ecchymosis:  none   Knee ROM:  0 to 115 degrees with minimal subpatellar crepitance.   Patella:  Patella does track normally. Mild patella click with ROM.  Patellar apprehension test: negative  Patellar compression test: negative   Tenderness: Medial and lateral joint line, patella   Stability:  Lachman's test: negative  Posterior drawer: negative  Medial collateral ligament: guarded  Lateral collateral ligament: negative     Zoila Test:  negative   Kadi's Test:  Guarded with joint line tenderness   Sensation:   intact to light touch   Pulses: normal DP and PT pulses.    Contralateral knee was without deficit.   Brisk cap refill.      Imaging   Radiographs 3V LT knee ordered and reviewed by me:     Left knee is intact with no fracture or radiopaque foreign body     Impression:     Normal left knee      Assessment:     Chronic left knee knee pain with exacerbation,  instability     Plan:      1. Etiology and treatment plan was reviewed.   2. Rx-Ibuprofen 800 mg prn as directed, ice and limit stairs, squatting and knee twisting.   3. Hinged knee brace applied for joint support, instructed on application and usage.   4. Start directed physician guided exercises for ROM and strengthening- [facility] knee conditioning packet given with exercises reviewed. HEP 18529 - She was instructed and demonstrated exercises per [facility] knee rehab exercises for HEP which include Heel cord stretch, standing quad stretch, supine hamstring stretch, half squats, hamstring curls, calf raises, leg extensions, straight leg raises, hip abduction, hip adduction and  leg presses. The patient then demonstrated understanding of exercises and proper technique. This program was performed for 15 minutes. Will be done 3 x wk x 6 wks.   5. RTC 6 weeks for follow up.  Will get MRI if any mechanical symptoms persist.  6. She had no further questions.

## 2024-03-22 ENCOUNTER — OFFICE VISIT (OUTPATIENT)
Dept: ORTHOPEDICS | Facility: CLINIC | Age: 29
End: 2024-03-22
Payer: MEDICAID

## 2024-03-22 DIAGNOSIS — G89.29 CHRONIC PAIN OF LEFT KNEE: Primary | ICD-10-CM

## 2024-03-22 DIAGNOSIS — M25.462 EFFUSION OF LEFT KNEE: ICD-10-CM

## 2024-03-22 DIAGNOSIS — M25.362 INSTABILITY OF LEFT KNEE JOINT: ICD-10-CM

## 2024-03-22 DIAGNOSIS — M25.562 CHRONIC PAIN OF LEFT KNEE: Primary | ICD-10-CM

## 2024-03-22 PROCEDURE — 1160F RVW MEDS BY RX/DR IN RCRD: CPT | Mod: CPTII,,, | Performed by: NURSE PRACTITIONER

## 2024-03-22 PROCEDURE — 99213 OFFICE O/P EST LOW 20 MIN: CPT | Mod: S$PBB,,, | Performed by: NURSE PRACTITIONER

## 2024-03-22 PROCEDURE — 99999 PR PBB SHADOW E&M-EST. PATIENT-LVL III: CPT | Mod: PBBFAC,,, | Performed by: NURSE PRACTITIONER

## 2024-03-22 PROCEDURE — 1159F MED LIST DOCD IN RCRD: CPT | Mod: CPTII,,, | Performed by: NURSE PRACTITIONER

## 2024-03-22 PROCEDURE — 99213 OFFICE O/P EST LOW 20 MIN: CPT | Mod: PBBFAC | Performed by: NURSE PRACTITIONER

## 2024-03-22 NOTE — PROGRESS NOTES
Subjective:      Follow up: Left knee pain:     Shannan Cline is a 28 y.o. female presents for follow up for chronic left knee pain. She presents and states that she has had no improvement. She has done 6 weeks of conservative treatment from the dates 24 to 24 with no improvement. She continues with global knee pain. She describes the symptoms as sharp, presently rates as 8/10. Symptoms worsen with ROM, activity, getting up from a chair, weight bearing and sitting for prolonged periods of time. The knee continues to gives out at times. The patient can bend and straighten the knee fully but has pain as previous. She is noted with a limp.      Outside reports reviewed: PCP notes                Review of patient's allergies indicates:   Allergen Reactions    Amoxicillin Swelling    Naproxen Nausea And Vomiting    Neurontin [gabapentin] Nausea And Vomiting    Toradol [ketorolac] Nausea And Vomiting    Tramadol Nausea And Vomiting              Past Medical History:   Diagnosis Date    Acid reflux      Anxiety      Depression      Digestive disorder       during pregnancy, unable to digest food    Iron deficiency anemia of pregnancy      Pregnancy        (vaginal delivery x3)                Past Surgical History:   Procedure Laterality Date    CHOLECYSTECTOMY        OVARIAN CYST REMOVAL        POSTPARTUM LIGATION OF FALLOPIAN TUBE Bilateral 2023     Procedure: LIGATION, FALLOPIAN TUBE, POSTPARTUM;  Surgeon: Cathy Ngo MD;  Location: [facility];  Service: OB/GYN;  Laterality: Bilateral;                Family History   Problem Relation Age of Onset    Alcohol abuse Mother      COPD Mother      Arthritis Father      Osteoporosis Mother      Alzheimer's disease Mother      Hypertension Father        Social History                Tobacco Use    Smoking status: Former       Current packs/day: 0.00       Types: Cigarettes       Quit date:        Years since quittin.0    Smokeless tobacco:  Never   Substance Use Topics    Alcohol use: Not Currently       Alcohol/week: 0.0 standard drinks of alcohol       Comment: rarely when not pregnant    Drug use: No      Review of Systems   Constitutional:  Negative for fever.   HENT:  Negative for sore throat.    Respiratory:  Negative for shortness of breath.    Cardiovascular:  Negative for chest pain.   Gastrointestinal:  Negative for nausea.   Genitourinary:  Negative for dysuria.   Musculoskeletal:  Positive for left knee pain  Skin:  Negative for rash.   Neurological:  Negative for weakness.   Hematological:  Does not bruise/bleed easily.   All other systems reviewed and are negative.        Objective:      NVI  General :   alert, appears stated age and cooperative   Gait: Antalgic gait   Left Lower Extremity  Hip Palpation:  no tenderness over the greater  trochanter   Hip ROM: 100% of normal    Knee Effusion:  trace   Ecchymosis:  none   Knee ROM:  0 to 115 degrees with minimal subpatellar crepitance. Pain with forced flexion.    Patella:  Patella does track normally.   Mild patella click with ROM.  Patellar apprehension test: negative  Patellar compression test: negative   Tenderness: Medial and lateral joint lines, patella   Stability:  Lachman's test: negative  Posterior drawer: negative  Medial collateral ligament: guarded  Lateral collateral ligament: negative     Zoila Test:  negative   Kadi's Test:  Guarded with joint line tenderness   Sensation:   intact to light touch   Pulses: normal DP and PT pulses.    Contralateral knee was without deficit.   Brisk cap refill.      Imaging   Radiographs 3V LT knee reviewed from 02/09/24     Left knee is intact with no fracture or radiopaque foreign body     Impression:     Normal left knee        Assessment:      Chronic left knee knee pain with exacerbation, instability with concern for internal deranegment     Plan:      1. MRI LT knee to evaluate for internal derangement. She has done 6 weeks of  conservative treatment from the dates 02/09/24 to 03/22/24 with no improvement. Treatment consisted of rest, bracing, Nsaid, and physician directed exercises that were done 3 x wk x 6 wks. The program was from [facility] knee conditioning/rehab packet. Regimen included Heel cord stretch, standing quad stretch, supine hamstring stretch, half squats, hamstring curls, calf raises, leg extensions, straight leg raises, hip abduction, hip adduction and  leg presses.   2. Continue Ibuprofen as previous, ice and limit stairs, squatting and knee twisting.   3. Hinged knee brace as previous.   4. Continue directed physician guided exercises for ROM and strengthening.   5. RTC post MRI for review.  6. She had no further questions.

## 2024-04-01 ENCOUNTER — HOSPITAL ENCOUNTER (OUTPATIENT)
Dept: RADIOLOGY | Facility: HOSPITAL | Age: 29
Discharge: HOME | End: 2024-04-01
Attending: NURSE PRACTITIONER
Payer: MEDICAID

## 2024-04-01 DIAGNOSIS — M25.562 CHRONIC PAIN OF LEFT KNEE: ICD-10-CM

## 2024-04-01 DIAGNOSIS — M25.362 INSTABILITY OF LEFT KNEE JOINT: ICD-10-CM

## 2024-04-01 DIAGNOSIS — G89.29 CHRONIC PAIN OF LEFT KNEE: ICD-10-CM

## 2024-04-01 DIAGNOSIS — M25.462 EFFUSION OF LEFT KNEE: ICD-10-CM

## 2024-04-01 PROCEDURE — 73721 MRI JNT OF LWR EXTRE W/O DYE: CPT | Mod: TC,LT

## 2024-04-02 ENCOUNTER — OFFICE VISIT (OUTPATIENT)
Dept: ORTHOPEDICS | Facility: CLINIC | Age: 29
End: 2024-04-02
Payer: MEDICAID

## 2024-04-02 DIAGNOSIS — M25.362 INSTABILITY OF LEFT KNEE JOINT: ICD-10-CM

## 2024-04-02 DIAGNOSIS — R29.898 WEAKNESS OF LEFT LOWER EXTREMITY: Primary | ICD-10-CM

## 2024-04-02 DIAGNOSIS — M54.10 RADICULAR PAIN OF LEFT LOWER EXTREMITY: ICD-10-CM

## 2024-04-02 DIAGNOSIS — M25.562 ACUTE PAIN OF LEFT KNEE: ICD-10-CM

## 2024-04-02 PROCEDURE — 99212 OFFICE O/P EST SF 10 MIN: CPT | Mod: PBBFAC | Performed by: NURSE PRACTITIONER

## 2024-04-02 PROCEDURE — 1160F RVW MEDS BY RX/DR IN RCRD: CPT | Mod: CPTII,,, | Performed by: NURSE PRACTITIONER

## 2024-04-02 PROCEDURE — 1159F MED LIST DOCD IN RCRD: CPT | Mod: CPTII,,, | Performed by: NURSE PRACTITIONER

## 2024-04-02 PROCEDURE — 99999 PR PBB SHADOW E&M-EST. PATIENT-LVL II: CPT | Mod: PBBFAC,,, | Performed by: NURSE PRACTITIONER

## 2024-04-02 PROCEDURE — 99213 OFFICE O/P EST LOW 20 MIN: CPT | Mod: S$PBB,,, | Performed by: NURSE PRACTITIONER

## 2024-04-02 RX ORDER — IBUPROFEN 800 MG/1
800 TABLET ORAL EVERY 8 HOURS PRN
Qty: 20 TABLET | Refills: 1 | Status: SHIPPED | OUTPATIENT
Start: 2024-04-02

## 2024-04-02 NOTE — PROGRESS NOTES
Subjective:      Follow up: Left knee MRI     Shannan Cline is a 28 y.o. female presents for follow up for chronic left knee pain. She presents and states that she has had no improvement. She is here for MRI review of the left knee. She continues with global knee pain and lower leg discomfort. She describes the symptoms as sharp, presently rates as 8/10. Symptoms worsen with ROM, activity, getting up from a chair, weight bearing and sitting for prolonged periods of time as previous. The knee continues to gives out at times. The patient can bend and straighten the knee fully but has pain as previous. She is noted with a limp. She states that the left knee to the foot feels cold or numb. She reports some weakness in the leg.                 Review of patient's allergies indicates:   Allergen Reactions    Amoxicillin Swelling    Naproxen Nausea And Vomiting    Neurontin [gabapentin] Nausea And Vomiting    Toradol [ketorolac] Nausea And Vomiting    Tramadol Nausea And Vomiting              Past Medical History:   Diagnosis Date    Acid reflux      Anxiety      Depression      Digestive disorder       during pregnancy, unable to digest food    Iron deficiency anemia of pregnancy      Pregnancy        (vaginal delivery x3)                Past Surgical History:   Procedure Laterality Date    CHOLECYSTECTOMY        OVARIAN CYST REMOVAL        POSTPARTUM LIGATION OF FALLOPIAN TUBE Bilateral 2023     Procedure: LIGATION, FALLOPIAN TUBE, POSTPARTUM;  Surgeon: Cathy Ngo MD;  Location: [facility] OR;  Service: OB/GYN;  Laterality: Bilateral;                Family History   Problem Relation Age of Onset    Alcohol abuse Mother      COPD Mother      Arthritis Father      Osteoporosis Mother      Alzheimer's disease Mother      Hypertension Father        Social History                Tobacco Use    Smoking status: Former       Current packs/day: 0.00       Types: Cigarettes       Quit date: 2012       Years since  quittin.0    Smokeless tobacco: Never   Substance Use Topics    Alcohol use: Not Currently       Alcohol/week: 0.0 standard drinks of alcohol       Comment: rarely when not pregnant    Drug use: No      Review of Systems   Constitutional:  Negative for fever.   HENT:  Negative for sore throat.    Respiratory:  Negative for shortness of breath.    Cardiovascular:  Negative for chest pain.   Gastrointestinal:  Negative for nausea.   Genitourinary:  Negative for dysuria.   Musculoskeletal:  Positive for left knee pain  Skin:  Negative for rash.   Neurological:  Negative for weakness.   Hematological:  Does not bruise/bleed easily.   All other systems reviewed and are negative.        Objective:      NVI  General :   alert, appears stated age and cooperative   Gait: Antalgic gait   Left Lower Extremity  Hip Palpation:  no tenderness over the greater  trochanter   Hip ROM: 100% of normal    Knee Effusion:  none   Ecchymosis:  none   Knee ROM:  0 to 120 degrees with minimal subpatellar crepitance. Pain with forced flexion.    Patella:  Patella does track normally.   Mild patella click with ROM.  Patellar apprehension test: negative  Patellar compression test: negative   Tenderness: Medial and lateral joint lines   Stability:  Lachman's test: negative  Posterior drawer: negative  Medial collateral ligament: negative  Lateral collateral ligament: negative     Zoila Test:  negative   Kadi's Test:  Guarded    Sensation:   intact to light touch   Pulses: normal DP and PT pulses.    Contralateral knee was without deficit.   Strength lower ext 4/5 LT LE.  Brisk cap refill.      Imaging   Radiographs 3V LT knee reviewed from 24     Left knee is intact with no fracture or radiopaque foreign body     Impression:     Normal left knee    LT knee MRI:   Intact ACL and PCL.  Intact menisci.  Intact quadriceps and patellar tendons.  Intact MCL and LCL.  Regional bone marrow and muscle signal intensity are within normal  limits.  No chondral defects.  No joint or periarticular fluid collections.     Impression:     Unremarkable left knee MRI     Assessment:      Chronic left knee knee pain  Weakness/numbness LT LE     Plan:      1. MRI LT knee was reviewed, no abnormalities. Previous radiographs showed no abnormalities. There are no structural defects or ligamentous injuries noted. There is no clear cut etiology of the chronic left knee pain. Pain source may be radicular in nature.   2. Continue Ibuprofen as previous, ice and limit stairs, squatting and knee twisting. Hinged knee brace as previous.   3. Continue directed physician guided exercises for ROM and strengthening. Discussed therapy, declines at this time, states she had in past with no benefit.   4. Referral to [facility] neurology for evaluation for left lower extremity numbness/weakness.    5. PRN prn.    6. She had no further questions.

## 2024-04-02 NOTE — LETTER
April 3, 2024               [facility]  [address]   [address]  [facility] [address]  Phone: [phone]  Fax: [phone]   Patient: Shannan Cline   MR Number: [id number]   YOB: 1995   Date of Visit: 4/2/2024       Dear CLARITA:    I am referring my patient, Shannan Cline, to you for evaluation for left lower extremity pain with mild associated numbness/weakness.      She  has a past medical history of Acid reflux, Anxiety, Depression, Digestive disorder, Iron deficiency anemia of pregnancy, and Pregnancy. Her  has a past surgical history that includes Ovarian cyst removal; Cholecystectomy; and Postpartum ligation of fallopian tube (Bilateral, 8/31/2023). She  reports that she quit smoking about 12 years ago. Her smoking use included cigarettes. She has never used smokeless tobacco. She reports that she does not currently use alcohol. She reports that she does not use drugs.    She has a current medication list which includes the following prescription(s): acetaminophen, famotidine, ferrous sulfate, ibuprofen, metoclopramide hcl, ondansetron, ondansetron, ondansetron, pnv no.153/fa/om3/dha/epa/fish, and prenatal complete.     She is allergic to amoxicillin, naproxen, neurontin [gabapentin], toradol [ketorolac], and tramadol.    I appreciate your assistance in her care and look forward to your findings and recommendations.    Sincerely,                           Fam Lomeli NP

## 2024-05-13 ENCOUNTER — HOSPITAL ENCOUNTER (EMERGENCY)
Facility: HOSPITAL | Age: 29
Discharge: HOME | End: 2024-05-13
Attending: EMERGENCY MEDICINE
Payer: MEDICAID

## 2024-05-13 VITALS
RESPIRATION RATE: 18 BRPM | BODY MASS INDEX: 29.06 KG/M2 | WEIGHT: 148 LBS | TEMPERATURE: 98 F | HEART RATE: 104 BPM | DIASTOLIC BLOOD PRESSURE: 82 MMHG | SYSTOLIC BLOOD PRESSURE: 131 MMHG | HEIGHT: 60 IN | OXYGEN SATURATION: 98 %

## 2024-05-13 DIAGNOSIS — J06.9 VIRAL URI WITH COUGH: Primary | ICD-10-CM

## 2024-05-13 LAB
CTP QC/QA: YES
CTP QC/QA: YES
POC MOLECULAR INFLUENZA A AGN: NEGATIVE
POC MOLECULAR INFLUENZA B AGN: NEGATIVE
SARS-COV-2 RDRP RESP QL NAA+PROBE: NEGATIVE

## 2024-05-13 PROCEDURE — 96372 THER/PROPH/DIAG INJ SC/IM: CPT | Performed by: NURSE PRACTITIONER

## 2024-05-13 PROCEDURE — 87635 SARS-COV-2 COVID-19 AMP PRB: CPT | Performed by: NURSE PRACTITIONER

## 2024-05-13 PROCEDURE — 87502 INFLUENZA DNA AMP PROBE: CPT

## 2024-05-13 PROCEDURE — 99284 EMERGENCY DEPT VISIT MOD MDM: CPT | Mod: 25

## 2024-05-13 PROCEDURE — 63600175 PHARM REV CODE 636 W HCPCS: Performed by: NURSE PRACTITIONER

## 2024-05-13 RX ORDER — PROMETHAZINE HYDROCHLORIDE AND DEXTROMETHORPHAN HYDROBROMIDE 6.25; 15 MG/5ML; MG/5ML
5 SYRUP ORAL EVERY 4 HOURS PRN
Qty: 180 ML | Refills: 0 | Status: SHIPPED | OUTPATIENT
Start: 2024-05-13

## 2024-05-13 RX ORDER — DEXAMETHASONE SODIUM PHOSPHATE 4 MG/ML
8 INJECTION, SOLUTION INTRA-ARTICULAR; INTRALESIONAL; INTRAMUSCULAR; INTRAVENOUS; SOFT TISSUE
Status: COMPLETED | OUTPATIENT
Start: 2024-05-13 | End: 2024-05-13

## 2024-05-13 RX ADMIN — DEXAMETHASONE SODIUM PHOSPHATE 8 MG: 4 INJECTION INTRA-ARTICULAR; INTRALESIONAL; INTRAMUSCULAR; INTRAVENOUS; SOFT TISSUE at 01:05

## 2024-05-13 NOTE — ED PROVIDER NOTES
Encounter Date: 2024       History     Chief Complaint   Patient presents with    Chest Congestion     Pt stated that she has been experiencing chest congestion / cough / sore throat / headache for the past couple days.      The history is provided by the patient.   URI  The primary symptoms include sore throat and cough. The current episode started two days ago. This is a new problem. The problem has not changed since onset.  The sore throat began yesterday. The sore throat has been unchanged since its onset.   The cough began yesterday. The cough is non-productive. There is nondescript sputum produced.   The onset of the illness is associated with exposure to sick contacts. Symptoms associated with the illness include sinus pressure, congestion and rhinorrhea. The following treatments were addressed: Acetaminophen was effective. A decongestant was not tried. Aspirin was not tried. NSAIDs were not tried.     Review of patient's allergies indicates:   Allergen Reactions    Amoxicillin Swelling    Naproxen Nausea And Vomiting    Neurontin [gabapentin] Nausea And Vomiting    Toradol [ketorolac] Nausea And Vomiting    Tramadol Nausea And Vomiting     Past Medical History:   Diagnosis Date    Acid reflux     Anxiety     Depression     Digestive disorder     during pregnancy, unable to digest food    Iron deficiency anemia of pregnancy     Pregnancy      (vaginal delivery x3)     Past Surgical History:   Procedure Laterality Date    CHOLECYSTECTOMY      OVARIAN CYST REMOVAL      POSTPARTUM LIGATION OF FALLOPIAN TUBE Bilateral 2023    Procedure: LIGATION, FALLOPIAN TUBE, POSTPARTUM;  Surgeon: Cathy Ngo MD;  Location: [facility];  Service: OB/GYN;  Laterality: Bilateral;     Family History   Problem Relation Name Age of Onset    Alcohol abuse Mother      COPD Mother      Arthritis Father      Osteoporosis Mother      Alzheimer's disease Mother      Hypertension Father       Social History     Tobacco  Use    Smoking status: Former     Current packs/day: 0.00     Types: Cigarettes     Quit date:      Years since quittin.3    Smokeless tobacco: Never   Substance Use Topics    Alcohol use: Not Currently     Alcohol/week: 0.0 standard drinks of alcohol     Comment: rarely when not pregnant    Drug use: No     Review of Systems   HENT:  Positive for congestion, rhinorrhea, sinus pressure and sore throat.    Respiratory:  Positive for cough.    All other systems reviewed and are negative.      Physical Exam     Initial Vitals [24 1134]   BP Pulse Resp Temp SpO2   131/82 104 18 98.2 °F (36.8 °C) 98 %      MAP       --         Physical Exam    Nursing note and vitals reviewed.  Constitutional: Vital signs are normal. She appears well-developed and well-nourished. She is cooperative.   HENT:   Head: Normocephalic and atraumatic.   Right Ear: External ear normal.   Left Ear: External ear normal.   Nose: Rhinorrhea present.   Mouth/Throat: Oropharynx is clear and moist.   Eyes: Conjunctivae are normal.   Neck: Neck supple.   Normal range of motion.  Cardiovascular:  Normal rate, regular rhythm, normal heart sounds and intact distal pulses.           Pulmonary/Chest: Breath sounds normal.   Abdominal: Abdomen is soft. Bowel sounds are normal.   Musculoskeletal:         General: Normal range of motion.      Cervical back: Normal range of motion and neck supple.     Neurological: She is alert and oriented to person, place, and time. She has normal strength.   Skin: Skin is warm and dry.   Psychiatric: She has a normal mood and affect. Her behavior is normal. Judgment and thought content normal.         ED Course   Procedures  Labs Reviewed   SARS-COV-2 RDRP GENE   POCT INFLUENZA A/B MOLECULAR          Imaging Results    None          Medications   dexAMETHasone injection 8 mg (has no administration in time range)     Medical Decision Making  URI symptoms for a couple of days. Other family members  sick    Differential Dx: URI, Viral syndrome, Sinusitis, Bronchitis     Amount and/or Complexity of Data Reviewed  Labs: ordered.  Discussion of management or test interpretation with external provider(s): Viral screen negative. No need for antibiotics presently. Request a shot. Given Decadron. OTC and PRN medications ok. Recheck with PCP or return to ER for new or worsening problems.     Risk  Prescription drug management.                                      Clinical Impression:  Final diagnoses:  [J06.9] Viral URI with cough (Primary)          ED Disposition Condition    Discharge Stable          ED Prescriptions       Medication Sig Dispense Start Date End Date Auth. Provider    promethazine-dextromethorphan (PROMETHAZINE-DM) 6.25-15 mg/5 mL Syrp Take 5 mLs by mouth every 4 (four) hours as needed (cough). 180 mL 5/13/2024 -- Spike Toney NP          Follow-up Information       Follow up With Specialties Details Why Contact Info    Libertad Riddle, FNP Family Medicine In 3 days As needed, If symptoms worsen [address] [place] [id number]  [phone]               Spike Toney NP  05/13/24 [id number]

## 2025-03-28 ENCOUNTER — HOSPITAL ENCOUNTER (EMERGENCY)
Facility: HOSPITAL | Age: 30
Discharge: HOME | End: 2025-03-28
Attending: EMERGENCY MEDICINE
Payer: MEDICAID

## 2025-03-28 VITALS
RESPIRATION RATE: 16 BRPM | SYSTOLIC BLOOD PRESSURE: 120 MMHG | HEART RATE: 72 BPM | HEIGHT: 61 IN | WEIGHT: 178.56 LBS | DIASTOLIC BLOOD PRESSURE: 78 MMHG | BODY MASS INDEX: 33.71 KG/M2 | OXYGEN SATURATION: 98 % | TEMPERATURE: 98 F

## 2025-03-28 DIAGNOSIS — R10.9 ABDOMINAL PAIN, UNSPECIFIED ABDOMINAL LOCATION: Primary | ICD-10-CM

## 2025-03-28 DIAGNOSIS — M79.604 PAIN OF RIGHT LOWER EXTREMITY: ICD-10-CM

## 2025-03-28 LAB
ABSOLUTE EOSINOPHIL (OHS): 0.17 K/UL
ABSOLUTE MONOCYTE (OHS): 1.06 K/UL (ref 0.3–1)
ABSOLUTE NEUTROPHIL COUNT (OHS): 5.68 K/UL (ref 1.8–7.7)
ALBUMIN SERPL BCP-MCNC: 3.9 G/DL (ref 3.5–5.2)
ALP SERPL-CCNC: 68 UNIT/L (ref 40–150)
ALT SERPL W/O P-5'-P-CCNC: 11 UNIT/L (ref 10–44)
ANION GAP (OHS): 4 MMOL/L (ref 8–16)
AST SERPL-CCNC: 22 UNIT/L (ref 11–45)
B-HCG UR QL: NEGATIVE
BASOPHILS # BLD AUTO: 0.08 K/UL
BASOPHILS NFR BLD AUTO: 0.9 %
BILIRUB SERPL-MCNC: 0.4 MG/DL (ref 0.1–1)
BILIRUB UR QL STRIP.AUTO: NEGATIVE
BUN SERPL-MCNC: 10 MG/DL (ref 6–20)
CALCIUM SERPL-MCNC: 8.8 MG/DL (ref 8.7–10.5)
CHLORIDE SERPL-SCNC: 108 MMOL/L (ref 95–110)
CLARITY UR: CLEAR
CO2 SERPL-SCNC: 25 MMOL/L (ref 23–29)
COLOR UR AUTO: YELLOW
CREAT SERPL-MCNC: 0.6 MG/DL (ref 0.5–1.4)
ERYTHROCYTE [DISTWIDTH] IN BLOOD BY AUTOMATED COUNT: 13.4 % (ref 11.5–14.5)
GFR SERPLBLD CREATININE-BSD FMLA CKD-EPI: >60 ML/MIN/1.73/M2
GLUCOSE SERPL-MCNC: 89 MG/DL (ref 70–110)
GLUCOSE UR QL STRIP: NEGATIVE
HCT VFR BLD AUTO: 38.1 % (ref 37–48.5)
HGB BLD-MCNC: 12 GM/DL (ref 12–16)
HGB UR QL STRIP: NEGATIVE
IMM GRANULOCYTES # BLD AUTO: 0.03 K/UL (ref 0–0.04)
IMM GRANULOCYTES NFR BLD AUTO: 0.4 % (ref 0–0.5)
KETONES UR QL STRIP: NEGATIVE
LEUKOCYTE ESTERASE UR QL STRIP: NEGATIVE
LIPASE SERPL-CCNC: 9 U/L (ref 4–60)
LYMPHOCYTES # BLD AUTO: 1.44 K/UL (ref 1–4.8)
MCH RBC QN AUTO: 28.6 PG (ref 27–50)
MCHC RBC AUTO-ENTMCNC: 31.5 G/DL (ref 32–36)
MCV RBC AUTO: 91 FL (ref 82–98)
NITRITE UR QL STRIP: NEGATIVE
NUCLEATED RBC (/100WBC) (OHS): 0 /100 WBC
PH UR STRIP: 6 [PH]
PLATELET # BLD AUTO: 302 K/UL (ref 150–450)
PMV BLD AUTO: 10 FL (ref 9.2–12.9)
POTASSIUM SERPL-SCNC: 4.2 MMOL/L (ref 3.5–5.1)
PROT SERPL-MCNC: 6.5 GM/DL (ref 6–8.4)
PROT UR QL STRIP: NEGATIVE
RBC # BLD AUTO: 4.2 M/UL (ref 4–5.4)
RELATIVE EOSINOPHIL (OHS): 2 %
RELATIVE LYMPHOCYTE (OHS): 17 % (ref 18–48)
RELATIVE MONOCYTE (OHS): 12.5 % (ref 4–15)
RELATIVE NEUTROPHIL (OHS): 67.2 % (ref 38–73)
SODIUM SERPL-SCNC: 137 MMOL/L (ref 136–145)
SP GR UR STRIP: 1.02
UROBILINOGEN UR STRIP-ACNC: 1 EU/DL
WBC # BLD AUTO: 8.46 K/UL (ref 3.9–12.7)

## 2025-03-28 PROCEDURE — 63600175 PHARM REV CODE 636 W HCPCS: Performed by: CLINICAL NURSE SPECIALIST

## 2025-03-28 PROCEDURE — 99284 EMERGENCY DEPT VISIT MOD MDM: CPT | Mod: 25

## 2025-03-28 PROCEDURE — 81003 URINALYSIS AUTO W/O SCOPE: CPT | Performed by: CLINICAL NURSE SPECIALIST

## 2025-03-28 PROCEDURE — 96372 THER/PROPH/DIAG INJ SC/IM: CPT | Performed by: CLINICAL NURSE SPECIALIST

## 2025-03-28 PROCEDURE — 83690 ASSAY OF LIPASE: CPT | Performed by: CLINICAL NURSE SPECIALIST

## 2025-03-28 PROCEDURE — 85025 COMPLETE CBC W/AUTO DIFF WBC: CPT | Performed by: CLINICAL NURSE SPECIALIST

## 2025-03-28 PROCEDURE — 25000003 PHARM REV CODE 250: Performed by: CLINICAL NURSE SPECIALIST

## 2025-03-28 PROCEDURE — 36415 COLL VENOUS BLD VENIPUNCTURE: CPT | Performed by: CLINICAL NURSE SPECIALIST

## 2025-03-28 PROCEDURE — 81025 URINE PREGNANCY TEST: CPT | Performed by: CLINICAL NURSE SPECIALIST

## 2025-03-28 PROCEDURE — 80053 COMPREHEN METABOLIC PANEL: CPT | Performed by: CLINICAL NURSE SPECIALIST

## 2025-03-28 RX ORDER — ONDANSETRON HYDROCHLORIDE 2 MG/ML
4 INJECTION, SOLUTION INTRAVENOUS ONCE
Status: DISCONTINUED | OUTPATIENT
Start: 2025-03-28 | End: 2025-03-28

## 2025-03-28 RX ORDER — ACETAMINOPHEN AND CODEINE PHOSPHATE 300; 30 MG/1; MG/1
1 TABLET ORAL
Status: COMPLETED | OUTPATIENT
Start: 2025-03-28 | End: 2025-03-28

## 2025-03-28 RX ORDER — ONDANSETRON HYDROCHLORIDE 2 MG/ML
4 INJECTION, SOLUTION INTRAVENOUS ONCE
Status: COMPLETED | OUTPATIENT
Start: 2025-03-28 | End: 2025-03-28

## 2025-03-28 RX ORDER — DEXAMETHASONE SODIUM PHOSPHATE 4 MG/ML
8 INJECTION, SOLUTION INTRA-ARTICULAR; INTRALESIONAL; INTRAMUSCULAR; INTRAVENOUS; SOFT TISSUE ONCE
Status: COMPLETED | OUTPATIENT
Start: 2025-03-28 | End: 2025-03-28

## 2025-03-28 RX ORDER — CYCLOBENZAPRINE HCL 10 MG
10 TABLET ORAL 3 TIMES DAILY PRN
Qty: 15 TABLET | Refills: 0 | Status: SHIPPED | OUTPATIENT
Start: 2025-03-28 | End: 2025-04-02

## 2025-03-28 RX ORDER — ONDANSETRON 4 MG/1
4 TABLET, ORALLY DISINTEGRATING ORAL
Status: DISCONTINUED | OUTPATIENT
Start: 2025-03-28 | End: 2025-03-28

## 2025-03-28 RX ADMIN — ACETAMINOPHEN AND CODEINE PHOSPHATE 1 TABLET: 300; 30 TABLET ORAL at 03:03

## 2025-03-28 RX ADMIN — DEXAMETHASONE SODIUM PHOSPHATE 8 MG: 4 INJECTION, SOLUTION INTRA-ARTICULAR; INTRALESIONAL; INTRAMUSCULAR; INTRAVENOUS; SOFT TISSUE at 03:03

## 2025-03-28 RX ADMIN — ONDANSETRON 4 MG: 2 INJECTION INTRAMUSCULAR; INTRAVENOUS at 02:03

## 2025-03-28 NOTE — ED PROVIDER NOTES
"Encounter Date: 3/28/2025       History     Chief Complaint   Patient presents with    Abdominal Pain     Patient reports vomiting, right sided abdominal pain radiating to right leg. She states she cannot straighten her right leg completely. Symptoms onset 3 hours ago.      29-year-old female presents to the emergency room complains of nausea, vomiting, right-sided abdominal pain radiating down her right lower extremity.  Pain began a proximally 3 hours ago.  Patient states she is concerned for "appendicitis due to it running in her family".  Patient reports pain is worse with movement and having difficulty straighten her right leg due to pain        Review of patient's allergies indicates:   Allergen Reactions    Amoxicillin Swelling    Naproxen Nausea And Vomiting    Neurontin [gabapentin] Nausea And Vomiting    Toradol [ketorolac] Nausea And Vomiting    Tramadol Nausea And Vomiting     Past Medical History:   Diagnosis Date    Acid reflux     Anxiety     Depression     Digestive disorder     during pregnancy, unable to digest food    Iron deficiency anemia of pregnancy     Pregnancy      (vaginal delivery x3)     Past Surgical History:   Procedure Laterality Date    CHOLECYSTECTOMY      OVARIAN CYST REMOVAL      POSTPARTUM LIGATION OF FALLOPIAN TUBE Bilateral 2023    Procedure: LIGATION, FALLOPIAN TUBE, POSTPARTUM;  Surgeon: Cathy Ngo MD;  Location: [facility];  Service: OB/GYN;  Laterality: Bilateral;     Family History   Problem Relation Name Age of Onset    Alcohol abuse Mother      COPD Mother      Arthritis Father      Osteoporosis Mother      Alzheimer's disease Mother      Hypertension Father       Social History[1]  Review of Systems   Constitutional:  Negative for fever.   HENT:  Negative for sore throat.    Respiratory:  Negative for shortness of breath.    Cardiovascular:  Negative for chest pain.   Gastrointestinal:  Positive for abdominal pain, nausea and vomiting.   Genitourinary:  " Negative for dysuria.   Musculoskeletal:  Positive for arthralgias, gait problem and myalgias. Negative for back pain.   Skin:  Negative for rash.   Neurological:  Negative for weakness.   Hematological:  Does not bruise/bleed easily.   All other systems reviewed and are negative.      Physical Exam     Initial Vitals [03/28/25 1342]   BP Pulse Resp Temp SpO2   117/85 84 18 98.3 °F (36.8 °C) 98 %      MAP       --         Physical Exam    Nursing note and vitals reviewed.  Constitutional: She appears well-developed and well-nourished.   HENT:   Head: Normocephalic and atraumatic.   Eyes: Pupils are equal, round, and reactive to light.   Neck:   Normal range of motion.  Cardiovascular:  Normal rate and regular rhythm.           Pulmonary/Chest: Breath sounds normal.   Abdominal: Abdomen is soft. Bowel sounds are normal. There is abdominal tenderness.   Generalized abdominal pain, patient reports worse to her right lower quadrant.  Denies any diarrhea.   Musculoskeletal:         General: Normal range of motion.      Cervical back: Normal range of motion.     Neurological: She is alert and oriented to person, place, and time.   Skin: Skin is warm and dry.   Psychiatric: She has a normal mood and affect.         ED Course   Procedures  Labs Reviewed   COMPREHENSIVE METABOLIC PANEL - Abnormal       Result Value    Sodium 137      Potassium 4.2      Chloride 108      CO2 25      Glucose 89      BUN 10      Creatinine 0.6      Calcium 8.8      Protein Total 6.5      Albumin 3.9      Bilirubin Total 0.4      ALP 68      AST 22      ALT 11      Anion Gap 4 (*)     eGFR >60     CBC WITH DIFFERENTIAL - Abnormal    WBC 8.46      RBC 4.20      HGB 12.0      HCT 38.1      MCV 91      MCH 28.6      MCHC 31.5 (*)     RDW 13.4      Platelet Count 302      MPV 10.0      Nucleated RBC 0      Neut % 67.2      Lymph % 17.0 (*)     Mono % 12.5      Eos % 2.0      Basophil % 0.9      Imm Grans % 0.4      Neut # 5.68      Lymph # 1.44       Mono # 1.06 (*)     Eos # 0.17      Baso # 0.08      Imm Grans # 0.03     PREGNANCY TEST, URINE RAPID - Normal    hCG Qualitative, Urine Negative     URINALYSIS, REFLEX TO URINE CULTURE - Normal    Color, UA Yellow      Appearance, UA Clear      pH, UA 6.0      Spec Grav UA 1.025      Protein, UA Negative      Glucose, UA Negative      Ketones, UA Negative      Bilirubin, UA Negative      Blood, UA Negative      Nitrites, UA Negative      Urobilinogen, UA 1.0      Leukocyte Esterase, UA Negative     LIPASE - Normal    Lipase Level 9     CBC W/ AUTO DIFFERENTIAL    Narrative:     The following orders were created for panel order CBC auto differential.  Procedure                               Abnormality         Status                     ---------                               -----------         ------                     CBC with Differential[[id number]]       Abnormal            Final result                 Please view results for these tests on the individual orders.          Imaging Results              CT Abdomen Pelvis  Without Contrast (Final result)  Result time 03/28/25 15:32:35      Final result by Lyndsey Mckeon MD (03/28/25 15:32:35)                   Impression:      1. No acute abnormality of abdomen or pelvis with no renal or ureteral stones and no renal obstruction  2. Normal appendix  3. Cholecystectomy      Electronically signed by: Lyndsey Mckeon MD  Date:    03/28/2025  Time:    15:32               Narrative:    EXAMINATION:  CT ABDOMEN PELVIS WITHOUT CONTRAST    CLINICAL HISTORY:  RLQ abdominal pain (Age >= 14y);    TECHNIQUE:  Iterative reconstruction technique was used.    CT/Cardiac Nuclear exams in prior 12 months: 0    COMPARISON:  10/31/2020.    FINDINGS:  Lung bases are clear.  The liver, spleen are normal.  There is been a cholecystectomy.  Non contrasted pancreas is grossly normal.  The adrenals and kidneys are normal with no stones or hydronephrosis.  The appendix is normal.  There is  no bowel obstruction.  There is no free air.                                       Medications   ondansetron injection 4 mg (4 mg Intramuscular Given 3/28/25 1411)   dexAMETHasone injection 8 mg (8 mg Intramuscular Given 3/28/25 1510)   acetaminophen-codeine 300-30mg per tablet 1 tablet (1 tablet Oral Given 3/28/25 1510)     Medical Decision Making  Amount and/or Complexity of Data Reviewed  Labs: ordered.  Radiology: ordered.    Risk  Prescription drug management.               ED Course as of 25 1550   Fri Mar 28, 2025   144 Discussed labs, urine.  Ordered pain medication due to pregnancy test negative.   [JT]   1547 Discussed CT results, reports improvement in pain.  Offered muscle relaxer here but declined. [JT]      ED Course User Index  [JT] Brandee Blount NP                           Clinical Impression:  Final diagnoses:  [R10.9] Abdominal pain, unspecified abdominal location (Primary)  [M79.604] Pain of right lower extremity          ED Disposition Condition    Discharge Stable          ED Prescriptions       Medication Sig Dispense Start Date End Date Auth. Provider    cyclobenzaprine (FLEXERIL) 10 MG tablet Take 1 tablet (10 mg total) by mouth 3 (three) times daily as needed for Muscle spasms. 15 tablet 3/28/2025 2025 Brandee Blount NP          Follow-up Information       Follow up With Specialties Details Why Contact Info    Libertad Riddle, [facility]  As needed [address] [place] [phone]  [phone]                 [1]   Social History  Tobacco Use    Smoking status: Former     Current packs/day: 0.00     Types: Cigarettes     Quit date:      Years since quittin.2    Smokeless tobacco: Never   Substance Use Topics    Alcohol use: Not Currently     Alcohol/week: 0.0 standard drinks of alcohol     Comment: rarely when not pregnant    Drug use: No        Brandee Blount NP  25 003